# Patient Record
Sex: FEMALE | Race: BLACK OR AFRICAN AMERICAN | Employment: UNEMPLOYED | URBAN - METROPOLITAN AREA
[De-identification: names, ages, dates, MRNs, and addresses within clinical notes are randomized per-mention and may not be internally consistent; named-entity substitution may affect disease eponyms.]

---

## 2017-02-04 ENCOUNTER — HOSPITAL ENCOUNTER (EMERGENCY)
Age: 42
Discharge: HOME | End: 2017-02-04
Attending: EMERGENCY MEDICINE
Payer: SUBSIDIZED

## 2017-02-04 VITALS
HEART RATE: 82 BPM | WEIGHT: 235 LBS | OXYGEN SATURATION: 97 % | BODY MASS INDEX: 39.11 KG/M2 | RESPIRATION RATE: 16 BRPM | SYSTOLIC BLOOD PRESSURE: 175 MMHG | DIASTOLIC BLOOD PRESSURE: 81 MMHG | TEMPERATURE: 98.2 F

## 2017-02-04 DIAGNOSIS — K08.89 PAIN, DENTAL: Primary | ICD-10-CM

## 2017-02-04 PROCEDURE — 99283 EMERGENCY DEPT VISIT LOW MDM: CPT

## 2017-02-04 PROCEDURE — 74011250637 HC RX REV CODE- 250/637: Performed by: EMERGENCY MEDICINE

## 2017-02-04 RX ORDER — ACETAMINOPHEN 325 MG/1
650 TABLET ORAL
Status: COMPLETED | OUTPATIENT
Start: 2017-02-04 | End: 2017-02-04

## 2017-02-04 RX ORDER — TRAMADOL HYDROCHLORIDE 50 MG/1
50 TABLET ORAL
Qty: 12 TAB | Refills: 0 | Status: SHIPPED | OUTPATIENT
Start: 2017-02-04 | End: 2017-05-15

## 2017-02-04 RX ORDER — PENICILLIN V POTASSIUM 500 MG/1
500 TABLET, FILM COATED ORAL 4 TIMES DAILY
Qty: 28 TAB | Refills: 0 | Status: SHIPPED | OUTPATIENT
Start: 2017-02-04 | End: 2017-02-11

## 2017-02-04 RX ADMIN — ACETAMINOPHEN 650 MG: 325 TABLET, FILM COATED ORAL at 04:55

## 2017-02-04 NOTE — ED PROVIDER NOTES
HPI Comments: 4:46 AM Lenora Corcoran is a 37 y.o. female with a hx of HTN who presents to the ED c/o ULQ and LLQ dental pain onset 2 days ago but has progressively gotten worse. Pt also c/o trouble chewing, but denies trouble swallowing. She reports taking Aleve and Advil for pain, but states that it has not provided her with any relief. She mentions that she has a scheduled dentist appointment on 17. No other associated symptoms or modifying factors at this time. The history is provided by the patient. Past Medical History:   Diagnosis Date    Amenorrhea 2012    Endometrial hyperplasia without atypia, simple 2012    Hypertension 2011    Obesity 3/5/2014       Past Surgical History:   Procedure Laterality Date    Hx breast lumpectomy      Hx gyn      Hx  section  7584,6557         Family History:   Problem Relation Age of Onset    Diabetes Maternal Aunt     Asthma Paternal Aunt     Cancer Maternal Grandmother      unknonwn type    Cancer Paternal Grandmother      unknown type       Social History     Social History    Marital status: SINGLE     Spouse name: N/A    Number of children: N/A    Years of education: N/A     Occupational History    Not on file. Social History Main Topics    Smoking status: Never Smoker    Smokeless tobacco: Never Used    Alcohol use No    Drug use: No      Comment: denies    Sexual activity: Not Currently     Partners: Male     Birth control/ protection: None     Other Topics Concern    Not on file     Social History Narrative         ALLERGIES: Review of patient's allergies indicates no known allergies. Review of Systems   Constitutional: Negative for fever. HENT: Positive for dental problem. All other systems reviewed and are negative.       Vitals:    17 0413   BP: 175/81   Pulse: 82   Resp: 16   Temp: 98.2 °F (36.8 °C)   SpO2: 97%   Weight: 106.6 kg (235 lb)            Physical Exam Constitutional: She is oriented to person, place, and time. She appears well-developed and well-nourished. HENT:   Head: Normocephalic and atraumatic. Mouth/Throat: Uvula is midline. No floor of the mouth crepitance    Neck: Neck supple. No JVD present. Cardiovascular: Normal rate and regular rhythm. Pulmonary/Chest: Effort normal and breath sounds normal. No respiratory distress. Abdominal: Soft. She exhibits no distension. There is no tenderness. There is no rebound and no guarding. Musculoskeletal: She exhibits no edema. Neurological: She is alert and oriented to person, place, and time. Skin: Skin is warm and dry. No erythema. Psychiatric: Judgment normal.        MDM  Number of Diagnoses or Management Options  Pain, dental:   Diagnosis management comments: 38 y/o female presents with dental pain . No signs of serious infection, ludwigs. Pt tolerating secretions, afebrile. Will give abx, pain meds  Pt has appt with dentist in 2/13. ED Course       Procedures  Vitals:  Patient Vitals for the past 12 hrs:   Temp Pulse Resp BP SpO2   02/04/17 0413 98.2 °F (36.8 °C) 82 16 175/81 97 %     Pulsox interpreted within normal limits. Medications ordered:   Medications   acetaminophen (TYLENOL) tablet 650 mg (not administered)       Lab findings:  No results found for this or any previous visit (from the past 12 hour(s)). Reevaluation of patient:   4:49 AM I have reassessed the patient and discussed their results and diagnosis. Pt will be discharged in stable condition. Patient is to return to emergency department if any new or worsening condition. Patient understands and verbalizes agreement with plan. Disposition:  Diagnosis:   1.  Pain, dental      Disposition: Discharged    Follow-up Information     Follow up With Details Comments Contact Info    [address] [facility] 600 Schedule an appointment as soon as possible for a visit  Leslee Martell [address] [facility] EMERGENCY DEPT Go to As needed, If symptoms worsen [address]            Patient's Medications   Start Taking    PENICILLIN V POTASSIUM (VEETID) 500 MG TABLET    Take 1 Tab by mouth four (4) times daily for 7 days. TRAMADOL (ULTRAM) 50 MG TABLET    Take 1 Tab by mouth every six (6) hours as needed for Pain. Max Daily Amount: 200 mg. Continue Taking    AMLODIPINE (NORVASC) 10 MG TABLET    Take 1 Tab by mouth daily. LISINOPRIL (PRINIVIL, ZESTRIL) 40 MG TABLET    Take 1 Tab by mouth daily. MEDROXYPROGESTERONE (PROVERA) 10 MG TABLET    Take  by mouth daily. These Medications have changed    No medications on file   Stop Taking    No medications on file     SCRIBE ATTESTATION STATEMENT  Documented by: Dez Pa for, and in the presence of, Raúl Rubio DO 4:49 AM    Signed by: Navdeep Deras, 02/04/17 4:49 AM    PROVIDER ATTESTATION STATEMENT  I personally performed the services described in the documentation, reviewed the documentation, as recorded by the scribe in my presence, and it accurately and completely records my words and actions.   Raúl Rubio DO

## 2017-02-04 NOTE — ED NOTES
I have reviewed discharge instructions with the patient. The patient verbalized understanding. Patient armband removed and shredded. 2 prescriptions given. All questions answered.   Pt d/c'd to home awake, alert and in NAD

## 2017-05-15 ENCOUNTER — OFFICE VISIT (OUTPATIENT)
Dept: OBGYN CLINIC | Age: 42
End: 2017-05-15

## 2017-05-15 ENCOUNTER — HOSPITAL ENCOUNTER (OUTPATIENT)
Dept: LAB | Age: 42
Discharge: HOME | End: 2017-05-15
Payer: MEDICAID

## 2017-05-15 VITALS
WEIGHT: 235 LBS | HEIGHT: 65 IN | BODY MASS INDEX: 39.15 KG/M2 | HEART RATE: 79 BPM | SYSTOLIC BLOOD PRESSURE: 169 MMHG | DIASTOLIC BLOOD PRESSURE: 113 MMHG

## 2017-05-15 DIAGNOSIS — I10 ESSENTIAL HYPERTENSION: ICD-10-CM

## 2017-05-15 DIAGNOSIS — Z12.39 BREAST CANCER SCREENING: ICD-10-CM

## 2017-05-15 DIAGNOSIS — N91.2 AMENORRHEA: ICD-10-CM

## 2017-05-15 DIAGNOSIS — Z01.419 WELL WOMAN EXAM WITH ROUTINE GYNECOLOGICAL EXAM: Primary | ICD-10-CM

## 2017-05-15 PROCEDURE — 88175 CYTOPATH C/V AUTO FLUID REDO: CPT | Performed by: OBSTETRICS & GYNECOLOGY

## 2017-05-15 PROCEDURE — 87624 HPV HI-RISK TYP POOLED RSLT: CPT | Performed by: OBSTETRICS & GYNECOLOGY

## 2017-05-15 RX ORDER — MEDROXYPROGESTERONE ACETATE 10 MG/1
10 TABLET ORAL DAILY
Qty: 10 TAB | Refills: 1 | Status: SHIPPED | OUTPATIENT
Start: 2017-05-15 | End: 2017-05-25

## 2017-05-15 NOTE — PROGRESS NOTES
Subjective:   37 y.o. female for annual routine Pap and checkup. No LMP recorded. Last pap smear:  2016 NILM  Menstrual history: amenorrhea, usually takes Provera to induce menses; h/o hyperplasia without atypia  Dysmenorrhea no  H/o STIs:  no  Social History: not sexually active. [unfilled]  OB History    Para Term  AB SAB TAB Ectopic Multiple Living   2 2 2       2      # Outcome Date GA Lbr Calderon/2nd Weight Sex Delivery Anes PTL Lv   2 Term  40w0d   M    Y      Birth Comments: repeat   1 Term 37 37w0d   M    Y      Birth Comments: failed to dialate          Pertinent past medical hstory: hypertension, no history of DVT, CAD, DM, liver disease, migraines or smoking. Patient Active Problem List   Diagnosis Code    Amenorrhea N91.2    Uterine enlargement N85.2    Endometrial hyperplasia without atypia, simple N85.01    Obesity E66.9    Hypertension I10    Hyperlipidemia E78.5     Patient Active Problem List    Diagnosis Date Noted    Hypertension 2014    Hyperlipidemia 2014    Obesity 2014    Endometrial hyperplasia without atypia, simple 2012    Amenorrhea 2012    Uterine enlargement 2012     Current Outpatient Prescriptions   Medication Sig Dispense Refill    medroxyPROGESTERone (PROVERA) 10 mg tablet Take 1 Tab by mouth daily for 10 days. 10 Tab 1    amLODIPine (NORVASC) 10 mg tablet Take 1 Tab by mouth daily. 30 Tab 0    lisinopril (PRINIVIL, ZESTRIL) 40 mg tablet Take 1 Tab by mouth daily.  30 Tab 5     No Known Allergies  Past Medical History:   Diagnosis Date    Amenorrhea 2012    Endometrial hyperplasia without atypia, simple 2012    Hypertension 2011    Obesity 3/5/2014     Past Surgical History:   Procedure Laterality Date    HX BREAST LUMPECTOMY      HX  SECTION  0918,0702    HX GYN       Family History   Problem Relation Age of Onset    Diabetes Maternal Aunt     Asthma Paternal Aunt     Cancer Maternal Grandmother      unknonwn type    Cancer Paternal Grandmother      unknown type     Social History   Substance Use Topics    Smoking status: Never Smoker    Smokeless tobacco: Never Used    Alcohol use No        ROS:  Feeling well. No dyspnea or chest pain on exertion. No abdominal pain, change in bowel habits, black or bloody stools. No urinary tract symptoms. GYN ROS: no pelvic pain or discharge, no breast pain or new or enlarging lumps on self exam. No neurological complaints. Objective:     Visit Vitals    BP (!) 169/113    Pulse 79    Ht 5' 5\" (1.651 m)    Wt 235 lb (106.6 kg)    BMI 39.11 kg/m2     The patient appears well, alert, oriented x 3, in no distress. ENT normal.  Neck supple. No adenopathy or thyromegaly. AMAURY. Lungs are clear, good air entry, no wheezes, rhonchi or rales. S1 and S2 normal, no murmurs, regular rate and rhythm. Abdomen soft without tenderness, guarding, mass or organomegaly. Extremities show no edema, normal peripheral pulses. Neurological is normal, no focal findings. BREAST EXAM: right breast normal without mass, skin or nipple changes or axillary nodes, left breast normal without mass, skin or nipple changes or axillary nodes    PELVIC EXAM: VULVA: normal appearing vulva with no masses, tenderness or lesions, VAGINA: normal appearing vagina with normal color and discharge, no lesions, CERVIX: normal appearing cervix without discharge or lesions, UTERUS: uterus is normal size, shape, consistency and nontender,  . Pelvic exam limited due to body habitus  . ADNEXA: normal adnexa in size, nontender and no masses, PAP: Pap smear done today    Assessment/Plan:   well woman  mammogram  pap smear  counseled on breast self exam and mammography screening  additional lab tests per orders  RTO for EMB and follow up for h/o endometrial hyperplasia. ICD-10-CM ICD-9-CM    1.  Well woman exam with routine gynecological exam Z01.419 V72.31 PAP IG, APTIMA HPV AND RFX 16/18,45 ([id number])   2. Breast cancer screening Z12.39 V76.10 [facility] MAMMO BI SCREENING INCL CAD   3. Amenorrhea N91.2 626.0 medroxyPROGESTERone (PROVERA) 10 mg tablet   4. Essential hypertension I10 401.9 REFERRAL TO FAMILY PRACTICE   . Discussed with patient that our office will call for abnormal lab results. Normal results can be reviewed through [employer]. If patient has not received a phone call from our office or there are no results found on [employer], the patient has been instructed to call our office to follow up on results. I have verbalized the plan of care with patient and the patient expressed understanding.    All questions were answered

## 2017-05-15 NOTE — LETTER
5/19/2017 1:56 PM 
 
Ms. Sherlyn Vargas [address] Dear Sherlyn Coburn I have reviewed your results and have found the results listed below to be within normal ranges. Pap Smear My recommendations are as follows: Please schedule your next Pap Smear in three years . Your Next Annual Physical is due May 2018. Please call if you have any questions [phone] . Sincerely, 
 
 
Ivet Do

## 2017-05-15 NOTE — PATIENT INSTRUCTIONS

## 2017-05-15 NOTE — MR AVS SNAPSHOT
Visit Information Date & Time Provider Department Dept. Phone Encounter #  
 5/15/2017 11:30 AM Renetta Cutler, [phone] [id number] Follow-up Instructions Return in about 2 weeks (around 5/29/2017) for EMB. Upcoming Health Maintenance Date Due INFLUENZA AGE 9 TO ADULT 8/1/2017 PAP AKA CERVICAL CYTOLOGY 2/24/2019 Allergies as of 5/15/2017  Review Complete On: 5/15/2017 By: Renetta Cutler,  No Known Allergies Current Immunizations  Never Reviewed No immunizations on file. Not reviewed this visit You Were Diagnosed With   
  
 Codes Comments Well woman exam with routine gynecological exam    -  Primary ICD-10-CM: K24.491 ICD-9-CM: V72.31 Breast cancer screening     ICD-10-CM: Z12.39 
ICD-9-CM: V76.10 Amenorrhea     ICD-10-CM: N91.2 ICD-9-CM: 626.0 Vitals BP Pulse Height(growth percentile) Weight(growth percentile) BMI OB Status (!) 169/113 79 5' 5\" (1.651 m) 235 lb (106.6 kg) 39.11 kg/m2 Having regular periods Smoking Status Never Smoker Vitals History BMI and BSA Data Body Mass Index Body Surface Area  
 39.11 kg/m 2 2.21 m 2 Preferred Pharmacy Pharmacy Name Phone [id number] Marycruz Gallagher, [address] [phone] Your Updated Medication List  
  
   
This list is accurate as of: 5/15/17 12:20 PM.  Always use your most recent med list. amLODIPine 10 mg tablet Commonly known as:  Hattie Rafter Take 1 Tab by mouth daily. lisinopril 40 mg tablet Commonly known as:  Dylan Lofty Take 1 Tab by mouth daily. medroxyPROGESTERone 10 mg tablet Commonly known as:  PROVERA Take 1 Tab by mouth daily for 10 days. Prescriptions Sent to Pharmacy Refills  
 medroxyPROGESTERone (PROVERA) 10 mg tablet 1 Sig: Take 1 Tab by mouth daily for 10 days.   
 Class: Normal  
 Pharmacy: [facility], [address] [place] [address] #: [phone] Route: Oral  
  
Follow-up Instructions Return in about 2 weeks (around 5/29/2017) for EMB. To-Do List   
 05/15/2017 Imaging:  [facility] MAMMO BI SCREENING INCL CAD Patient Instructions Well Visit, Ages 25 to 48: Care Instructions Your Care Instructions Physical exams can help you stay healthy. Your doctor has checked your overall health and may have suggested ways to take good care of yourself. He or she also may have recommended tests. At home, you can help prevent illness with healthy eating, regular exercise, and other steps. Follow-up care is a key part of your treatment and safety. Be sure to make and go to all appointments, and call your doctor if you are having problems. It's also a good idea to know your test results and keep a list of the medicines you take. How can you care for yourself at home? · Reach and stay at a healthy weight. This will lower your risk for many problems, such as obesity, diabetes, heart disease, and high blood pressure. · Get at least 30 minutes of physical activity on most days of the week. Walking is a good choice. You also may want to do other activities, such as running, swimming, cycling, or playing tennis or team sports. Discuss any changes in your exercise program with your doctor. · Do not smoke or allow others to smoke around you. If you need help quitting, talk to your doctor about stop-smoking programs and medicines. These can increase your chances of quitting for good. · Talk to your doctor about whether you have any risk factors for sexually transmitted infections (STIs). Having one sex partner (who does not have STIs and does not have sex with anyone else) is a good way to avoid these infections. · Use birth control if you do not want to have children at this time.  Talk with your doctor about the choices available and what might be best for you. 
· Protect your skin from too much sun. When you're outdoors from 10 a.m. to 4 p.m., stay in the shade or cover up with clothing and a hat with a wide brim. Wear sunglasses that block UV rays. Even when it's cloudy, put broad-spectrum sunscreen (SPF 30 or higher) on any exposed skin. · See a dentist one or two times a year for checkups and to have your teeth cleaned. · Wear a seat belt in the car. · Drink alcohol in moderation, if at all. That means no more than 2 drinks a day for men and 1 drink a day for women. Follow your doctor's advice about when to have certain tests. These tests can spot problems early. For everyone · Cholesterol. Have the fat (cholesterol) in your blood tested after age 21. Your doctor will tell you how often to have this done based on your age, family history, or other things that can increase your risk for heart disease. · Blood pressure. Have your blood pressure checked during a routine doctor visit. Your doctor will tell you how often to check your blood pressure based on your age, your blood pressure results, and other factors. · Vision. Talk with your doctor about how often to have a glaucoma test. 
· Diabetes. Ask your doctor whether you should have tests for diabetes. · Colon cancer. Have a test for colon cancer at age 48. You may have one of several tests. If you are younger than 48, you may need a test earlier if you have any risk factors. Risk factors include whether you already had a precancerous polyp removed from your colon or whether your parent, brother, sister, or child has had colon cancer. For women · Breast exam and mammogram. Talk to your doctor about when you should have a clinical breast exam and a mammogram. Medical experts differ on whether and how often women under 50 should have these tests. Your doctor can help you decide what is right for you. · Pap test and pelvic exam. Begin Pap tests at age 24.  A Pap test is the best way to find cervical cancer. The test often is part of a pelvic exam. Ask how often to have this test. 
· Tests for sexually transmitted infections (STIs). Ask whether you should have tests for STIs. You may be at risk if you have sex with more than one person, especially if your partners do not wear condoms. For men · Tests for sexually transmitted infections (STIs). Ask whether you should have tests for STIs. You may be at risk if you have sex with more than one person, especially if you do not wear a condom. · Testicular cancer exam. Ask your doctor whether you should check your testicles regularly. · Prostate exam. Talk to your doctor about whether you should have a blood test (called a PSA test) for prostate cancer. Experts differ on whether and when men should have this test. Some experts suggest it if you are older than 39 and are -American or have a father or brother who got prostate cancer when he was younger than 72. When should you call for help? Watch closely for changes in your health, and be sure to contact your doctor if you have any problems or symptoms that concern you. Where can you learn more? Go to http://kerry-chucho.info/. Enter P072 in the search box to learn more about \"Well Visit, Ages 25 to 48: Care Instructions. \" Current as of: July 19, 2016 Content Version: 11.2 © 6312-2002 [employer]. Care instructions adapted under license by [employer] (which disclaims liability or warranty for this information). If you have questions about a medical condition or this instruction, always ask your healthcare professional. [place] [address] any warranty or liability for your use of this information. Introducing [address]! [facility] introduces [employer] patient portal. Now you can access parts of your medical record, email your doctor's office, and request medication refills online. 1. In your internet browser, go to [email or web address] 2. Click on the First Time User? Click Here link in the Sign In box. You will see the New Member Sign Up page. 3. Enter your Ogin Access Code exactly as it appears below. You will not need to use this code after youve completed the sign-up process. If you do not sign up before the expiration date, you must request a new code. · Ogin Access Code: [email or web address] Expires: 8/13/2017 12:20 PM 
 
4. Enter the last four digits of your Social Security Number (xxxx) and Date of Birth (mm/dd/yyyy) as indicated and click Submit. You will be taken to the next sign-up page. 5. Create a Ogin ID. This will be your Ogin login ID and cannot be changed, so think of one that is secure and easy to remember. 6. Create a Ogin password. You can change your password at any time. 7. Enter your Password Reset Question and Answer. This can be used at a later time if you forget your password. 8. Enter your e-mail address. You will receive e-mail notification when new information is available in [address] 9. Click Sign Up. You can now view and download portions of your medical record. 10. Click the Download Summary menu link to download a portable copy of your medical information. If you have questions, please visit the Frequently Asked Questions section of the Ogin website. Remember, Ogin is NOT to be used for urgent needs. For medical emergencies, dial 911. Now available from your iPhone and Android! Please provide this summary of care documentation to your next provider. Your primary care clinician is listed as NONE. If you have any questions after today's visit, please call [phone].

## 2017-05-18 NOTE — PROGRESS NOTES
Pap NILM. Repeat pap in 3 years or as clinically indicated. PRASAD Wilson to send letter to patient with above recommendation.

## 2017-05-30 ENCOUNTER — OFFICE VISIT (OUTPATIENT)
Dept: OBGYN CLINIC | Age: 42
End: 2017-05-30

## 2017-05-30 ENCOUNTER — HOSPITAL ENCOUNTER (OUTPATIENT)
Dept: LAB | Age: 42
Discharge: HOME | End: 2017-05-30
Payer: SUBSIDIZED

## 2017-05-30 VITALS
SYSTOLIC BLOOD PRESSURE: 153 MMHG | WEIGHT: 239 LBS | BODY MASS INDEX: 39.82 KG/M2 | TEMPERATURE: 98.5 F | HEIGHT: 65 IN | DIASTOLIC BLOOD PRESSURE: 96 MMHG | HEART RATE: 79 BPM

## 2017-05-30 DIAGNOSIS — N85.01 ENDOMETRIAL HYPERPLASIA WITHOUT ATYPIA, SIMPLE: Primary | ICD-10-CM

## 2017-05-30 LAB
HCG URINE, QL. (POC): NEGATIVE
VALID INTERNAL CONTROL?: YES

## 2017-05-30 PROCEDURE — 88305 TISSUE EXAM BY PATHOLOGIST: CPT | Performed by: OBSTETRICS & GYNECOLOGY

## 2017-05-30 NOTE — PROGRESS NOTES
Endometrial Biopsy Procedure Note    Endometrial biopsy was performed today. Indications: followup to previous abnormal endometrial biopsy    Procedure Details   Urine pregnancy test was done and result is negative. The risks (including infection, bleeding, pain, and uterine perforation) and benefits of the procedure were explained to the patient and written informed consent was obtained. Antibiotic prophylaxis against endocarditis was not indicated. The patient was placed in the dorsal lithotomy position. A speculum inserted in the vagina, and the cervix prepped with povidone iodine. A sterile uterine sound was used to sound the uterus to a depth of 10 cm. A Pipelle endometrial aspirator was used to sample the endometrium. Sample was sent for pathologic examination. The patient tolerated the procedure well and there were no complications. Plan:  The patient was advised to call for any fever or for prolonged or severe pain or bleeding. She was advised to use OTC ibuprofen as needed for mild to moderate pain. She was advised to avoid vaginal intercourse for 48 hours or until the bleeding has completely stopped. My office will get in touch with her as soon as we have the pathology report. I have verbalized the plan of care with patient. The patient was given a full opportunity to ask questions and indicated that her questions had been answered.

## 2017-05-30 NOTE — PATIENT INSTRUCTIONS
Endometrial Biopsy: About This Test  What is it? An endometrial biopsy is a way for your doctor to take a small sample of the lining of the uterus (endometrium). The sample is looked at under a microscope for abnormal cells. An endometrial biopsy helps your doctor find problems in the endometrium. Why is this test done? An endometrial biopsy is done to check for cancer of the uterus. The test is also done if you have abnormal bleeding from your uterus or are having problems getting pregnant. The test results show how your body's hormones are affecting the lining of the uterus. How can you prepare for the test?  Talk to your doctor about all your health conditions before the test. For example, tell your doctor if you:  · Are or might be pregnant. An endometrial biopsy is not done during pregnancy. · Are taking any medicines. · Are allergic to any medicines. · Have had bleeding problems, or if you take aspirin or some other blood thinner. · Have been treated for an infection in your pelvic area. · Have any heart or lung problems. Other ways to prepare:  · Do not douche, use tampons, or use vaginal medicines for 24 hours before the test.  · Ask your doctor if you should take a pain reliever, such as ibuprofen (Advil or Motrin), 30 to 60 minutes before the test. This can help reduce any cramping pain that the test can cause. · Talk to your doctor if you have concerns about the need for the test, its risks, how it will be done, or what the results may mean. What happens before the test?  · You will empty your bladder just before the test.  · You will be asked to sign a consent form that says you understand the risks of the test and agree to have it done. What happens during the test?  · You will lie on an exam table. Your feet will be in stirrups. · The doctor may use a spray or injection to numb your cervix. The cervix is the opening to the uterus.   · The doctor will use a tool called a speculum to see the cervix. · Then the doctor will pass a thin tube through the cervix to take a sample of the uterus lining. You may feel a sharp cramp when the doctor collects the sample. · The sample is sent to a lab. How long does the test take? The test will take about 5 to 15 minutes. What happens after the test?  · You will probably be able to go home right away. · You likely will have mild vaginal bleeding and may have cramps for a few days after the test. The cramps may feel like bad menstrual cramps. · Ask your doctor if you can take an over-the-counter pain medicine, such as acetaminophen (Tylenol), ibuprofen (Advil, Motrin), or naproxen (Aleve). Be safe with medicines. Read and follow all instructions on the label. · Do not have sex, use tampons, or douche until the spotting stops. Use pads for vaginal bleeding or discharge. · Do not do strenuous exercise or heavy lifting for one day after your biopsy. Follow-up care is a key part of your treatment and safety. Be sure to make and go to all appointments, and call your doctor if you are having problems. It's also a good idea to keep a list of the medicines you take. Ask your doctor when you can expect to have your test results. Where can you learn more? Go to http://kerry-chucho.info/. Enter [phone] in the search box to learn more about \"Endometrial Biopsy: About This Test.\"  Current as of: October 13, 2016  Content Version: 11.2  © 3984-3769 [employer]. Care instructions adapted under license by [employer] (which disclaims liability or warranty for this information). If you have questions about a medical condition or this instruction, always ask your healthcare professional. [address] any warranty or liability for your use of this information.

## 2017-05-30 NOTE — MR AVS SNAPSHOT
Visit Information Date & Time Provider Department Dept. Phone Encounter #  
 5/30/2017 11:00 AM [phone] [id number] Follow-up Instructions Return in about 2 weeks (around 6/13/2017). Upcoming Health Maintenance Date Due INFLUENZA AGE 9 TO ADULT 8/1/2017 PAP AKA CERVICAL CYTOLOGY 5/15/2020 Allergies as of 5/30/2017  Review Complete On: 5/30/2017 By: Linda Centeno, DO No Known Allergies Current Immunizations  Never Reviewed No immunizations on file. Not reviewed this visit You Were Diagnosed With   
  
 Codes Comments Endometrial hyperplasia without atypia, simple    -  Primary ICD-10-CM: N85.01 
ICD-9-CM: 621.31 Vitals BP Pulse Temp Height(growth percentile) Weight(growth percentile) LMP  
 (!) 153/96 79 98.5 °F (36.9 °C) (Oral) 5' 5\" (1.651 m) 239 lb (108.4 kg) 03/03/2017 (Within Days) BMI OB Status Smoking Status 39.77 kg/m2 Having regular periods Never Smoker BMI and BSA Data Body Mass Index Body Surface Area  
 39.77 kg/m 2 2.23 m 2 Preferred Pharmacy Pharmacy Name Phone [id number] [place] [address] [phone] Your Updated Medication List  
  
   
This list is accurate as of: 5/30/17 11:46 AM.  Always use your most recent med list. amLODIPine 10 mg tablet Commonly known as:  Benjiman Floro Take 1 Tab by mouth daily. lisinopril 40 mg tablet Commonly known as:  Rexanne  Take 1 Tab by mouth daily. We Performed the Following AMB POC URINE PREGNANCY TEST, VISUAL COLOR COMPARISON [78837 CPT(R)] Follow-up Instructions Return in about 2 weeks (around 6/13/2017). Patient Instructions Endometrial Biopsy: About This Test 
What is it?  
 
An endometrial biopsy is a way for your doctor to take a small sample of the lining of the uterus (endometrium). The sample is looked at under a microscope for abnormal cells. An endometrial biopsy helps your doctor find problems in the endometrium. Why is this test done? An endometrial biopsy is done to check for cancer of the uterus. The test is also done if you have abnormal bleeding from your uterus or are having problems getting pregnant. The test results show how your body's hormones are affecting the lining of the uterus. How can you prepare for the test? 
Talk to your doctor about all your health conditions before the test. For example, tell your doctor if you: · Are or might be pregnant. An endometrial biopsy is not done during pregnancy. · Are taking any medicines. · Are allergic to any medicines. · Have had bleeding problems, or if you take aspirin or some other blood thinner. · Have been treated for an infection in your pelvic area. · Have any heart or lung problems. Other ways to prepare: · Do not douche, use tampons, or use vaginal medicines for 24 hours before the test. 
· Ask your doctor if you should take a pain reliever, such as ibuprofen (Advil or Motrin), 30 to 60 minutes before the test. This can help reduce any cramping pain that the test can cause. · Talk to your doctor if you have concerns about the need for the test, its risks, how it will be done, or what the results may mean. What happens before the test? 
· You will empty your bladder just before the test. 
· You will be asked to sign a consent form that says you understand the risks of the test and agree to have it done. What happens during the test? 
· You will lie on an exam table. Your feet will be in stirrups. · The doctor may use a spray or injection to numb your cervix. The cervix is the opening to the uterus. · The doctor will use a tool called a speculum to see the cervix.  
· Then the doctor will pass a thin tube through the cervix to take a sample of the uterus lining. You may feel a sharp cramp when the doctor collects the sample. · The sample is sent to a lab. How long does the test take? The test will take about 5 to 15 minutes. What happens after the test? 
· You will probably be able to go home right away. · You likely will have mild vaginal bleeding and may have cramps for a few days after the test. The cramps may feel like bad menstrual cramps. · Ask your doctor if you can take an over-the-counter pain medicine, such as acetaminophen (Tylenol), ibuprofen (Advil, Motrin), or naproxen (Aleve). Be safe with medicines. Read and follow all instructions on the label. · Do not have sex, use tampons, or douche until the spotting stops. Use pads for vaginal bleeding or discharge. · Do not do strenuous exercise or heavy lifting for one day after your biopsy. Follow-up care is a key part of your treatment and safety. Be sure to make and go to all appointments, and call your doctor if you are having problems. It's also a good idea to keep a list of the medicines you take. Ask your doctor when you can expect to have your test results. Where can you learn more? Go to http://kerry-chucho.info/. Enter [phone] in the search box to learn more about \"Endometrial Biopsy: About This Test.\" Current as of: October 13, 2016 Content Version: 11.2 © 7942-8829 [employer]. Care instructions adapted under license by [employer] (which disclaims liability or warranty for this information). If you have questions about a medical condition or this instruction, always ask your healthcare professional. [place] [address] any warranty or liability for your use of this information. Introducing [facility]! Nona Chaves introduces [employer]PD[employer] patient portal. Now you can access parts of your medical record, email your doctor's office, and request medication refills online. 1. In your internet browser, go to [email or web address] 2. Click on the First Time User? Click Here link in the Sign In box. You will see the New Member Sign Up page. 3. Enter your [employer] Access Code exactly as it appears below. You will not need to use this code after youve completed the sign-up process. If you do not sign up before the expiration date, you must request a new code. · [employer] Access Code: [email or web address] Expires: 8/13/2017 12:20 PM 
 
4. Enter the last four digits of your Social Security Number (xxxx) and Date of Birth (mm/dd/yyyy) as indicated and click Submit. You will be taken to the next sign-up page. 5. Create a [employer]Bureau ID. This will be your [employer] login ID and cannot be changed, so think of one that is secure and easy to remember. 6. Create a [employer] password. You can change your password at any time. 7. Enter your Password Reset Question and Answer. This can be used at a later time if you forget your password. 8. Enter your e-mail address. You will receive e-mail notification when new information is available in [address] 9. Click Sign Up. You can now view and download portions of your medical record. 10. Click the Download Summary menu link to download a portable copy of your medical information. If you have questions, please visit the Frequently Asked Questions section of the [employer] website. Remember, [employer] is NOT to be used for urgent needs. For medical emergencies, dial 911. Now available from your iPhone and Android! Please provide this summary of care documentation to your next provider. Your primary care clinician is listed as NONE. If you have any questions after today's visit, please call [phone].

## 2017-06-13 ENCOUNTER — HOSPITAL ENCOUNTER (OUTPATIENT)
Dept: LAB | Age: 42
Discharge: HOME | End: 2017-06-13
Payer: SUBSIDIZED

## 2017-06-13 ENCOUNTER — OFFICE VISIT (OUTPATIENT)
Dept: OBGYN CLINIC | Age: 42
End: 2017-06-13

## 2017-06-13 VITALS
BODY MASS INDEX: 39.15 KG/M2 | DIASTOLIC BLOOD PRESSURE: 104 MMHG | HEART RATE: 82 BPM | HEIGHT: 65 IN | WEIGHT: 235 LBS | SYSTOLIC BLOOD PRESSURE: 153 MMHG

## 2017-06-13 DIAGNOSIS — N30.00 ACUTE CYSTITIS WITHOUT HEMATURIA: ICD-10-CM

## 2017-06-13 DIAGNOSIS — N30.00 ACUTE CYSTITIS WITHOUT HEMATURIA: Primary | ICD-10-CM

## 2017-06-13 DIAGNOSIS — Z30.09 FAMILY PLANNING: ICD-10-CM

## 2017-06-13 DIAGNOSIS — N94.9 GENITAL LESION, FEMALE: ICD-10-CM

## 2017-06-13 PROBLEM — A63.0 GENITAL WARTS: Status: ACTIVE | Noted: 2017-06-13

## 2017-06-13 LAB
BILIRUB UR QL STRIP: NEGATIVE
GLUCOSE UR-MCNC: NEGATIVE MG/DL
KETONES P FAST UR STRIP-MCNC: NEGATIVE MG/DL
PH UR STRIP: 7 [PH] (ref 4.6–8)
PROT UR QL STRIP: NEGATIVE MG/DL
SP GR UR STRIP: 1.03 (ref 1–1.03)
UA UROBILINOGEN AMB POC: NORMAL (ref 0.2–1)
URINALYSIS CLARITY POC: CLEAR
URINALYSIS COLOR POC: YELLOW
URINE BLOOD POC: NEGATIVE
URINE LEUKOCYTES POC: NEGATIVE
URINE NITRITES POC: POSITIVE

## 2017-06-13 PROCEDURE — 87086 URINE CULTURE/COLONY COUNT: CPT | Performed by: OBSTETRICS & GYNECOLOGY

## 2017-06-13 PROCEDURE — 87077 CULTURE AEROBIC IDENTIFY: CPT | Performed by: OBSTETRICS & GYNECOLOGY

## 2017-06-13 PROCEDURE — 87186 SC STD MICRODIL/AGAR DIL: CPT | Performed by: OBSTETRICS & GYNECOLOGY

## 2017-06-13 PROCEDURE — 87255 GENET VIRUS ISOLATE HSV: CPT | Performed by: OBSTETRICS & GYNECOLOGY

## 2017-06-13 PROCEDURE — 87491 CHLMYD TRACH DNA AMP PROBE: CPT | Performed by: OBSTETRICS & GYNECOLOGY

## 2017-06-13 RX ORDER — SULFAMETHOXAZOLE AND TRIMETHOPRIM 800; 160 MG/1; MG/1
1 TABLET ORAL 2 TIMES DAILY
Qty: 6 TAB | Refills: 0 | Status: SHIPPED | OUTPATIENT
Start: 2017-06-13 | End: 2017-06-16

## 2017-06-13 RX ORDER — ACETAMINOPHEN AND CODEINE PHOSPHATE 120; 12 MG/5ML; MG/5ML
1 SOLUTION ORAL DAILY
Qty: 28 TAB | Refills: 11 | Status: SHIPPED | OUTPATIENT
Start: 2017-06-13 | End: 2018-05-10 | Stop reason: SDUPTHER

## 2017-06-13 RX ORDER — ACYCLOVIR 400 MG/1
400 TABLET ORAL 3 TIMES DAILY
Qty: 30 TAB | Refills: 0 | Status: SHIPPED | OUTPATIENT
Start: 2017-06-13 | End: 2017-06-23

## 2017-06-13 NOTE — PATIENT INSTRUCTIONS
Mini-Pills for Birth Control: Care Instructions  Your Care Instructions  Mini-pills are used to prevent pregnancy. They release a regular dose of the hormone progestin. They are different from regular combination birth control pills, which contain both progestin and estrogen. Mini-pills come in packs. Every mini-pill in the pack contains progestin. There are no sugar pills. So you have to take a pill every day at the same time to prevent pregnancy, even during your period. Follow-up care is a key part of your treatment and safety. Be sure to make and go to all appointments, and call your doctor if you are having problems. It's also a good idea to know your test results and keep a list of the medicines you take. How can you care for yourself at home? How do you take the mini-pill? · Follow your doctor's instructions about when to start taking your pills. It's best to take your first pill on the first day of your period. If you take the first pill another day, use backup birth control, such as a condom, or don't have intercourse for the next 48 hours (2 days). · Take every mini-pill in the pack, even during your period. Don't stop taking your pills if you have spotting between periods. · Take the pill at the same time every day. · Start your next pack the day after the last pack is finished. There is no break between packs. Always have your next pack of pills ready. What if you forget to take a pill? Always read the label for specific instructions, or call your doctor. Here are some basic guidelines:  · If you take a pill more than 3 hours late, take it as soon as you remember, even if that means you will take 2 pills in one day. Then go back to your regular schedule. · Use backup birth control, such as a condom, or don't have intercourse for the next 48 hours to prevent pregnancy.   · If you had intercourse within 5 days of forgetting to take the pill, you can use emergency contraception, such as the morning-after pill (Plan B). You can use emergency contraception for up to 5 days after having had sex, but it works best if you take it right away. What else do you need to know? · The mini-pill has side effects. ¨ You may have changes in your period and your period may stop. You may also have spotting or bleeding between periods. ¨ You may have mood changes, less interest in sex, or weight gain. · If you vomit or have diarrhea soon after taking a pill, use a backup method or don't have intercourse for 7 days. · Check with your doctor before you use any other medicines, including over-the-counter medicines, vitamins, herbal products, and supplements. Birth control hormones may not work as well to prevent pregnancy when combined with other medicines. · The mini-pill doesn't protect against sexually transmitted infections (STIs), such as herpes or HIV/AIDS. If you're not sure whether your sex partner might have an STI, use a condom to protect against disease. When should you call for help? Call your doctor now or seek immediate medical care if:  · You have severe belly pain. Watch closely for changes in your health, and be sure to contact your doctor if:  · You think you may be pregnant. · You have any problems with your birth control. · You feel you may be depressed. · You regularly have spotting. · You think you may have been exposed to or have a sexually transmitted infection. Where can you learn more? Go to http://kerry-chucho.info/. Enter T269 in the search box to learn more about \"Mini-Pills for Birth Control: Care Instructions. \"  Current as of: May 30, 2016  Content Version: 11.2  © 8312-2758 [employer]. Care instructions adapted under license by [employer] (which disclaims liability or warranty for this information).  If you have questions about a medical condition or this instruction, always ask your healthcare professional. [employer] disclaims any warranty or liability for your use of this information. Urinary Tract Infection in Women: Care Instructions  Your Care Instructions    A urinary tract infection, or UTI, is a general term for an infection anywhere between the kidneys and the urethra (where urine comes out). Most UTIs are bladder infections. They often cause pain or burning when you urinate. UTIs are caused by bacteria and can be cured with antibiotics. Be sure to complete your treatment so that the infection goes away. Follow-up care is a key part of your treatment and safety. Be sure to make and go to all appointments, and call your doctor if you are having problems. It's also a good idea to know your test results and keep a list of the medicines you take. How can you care for yourself at home? · Take your antibiotics as directed. Do not stop taking them just because you feel better. You need to take the full course of antibiotics. · Drink extra water and other fluids for the next day or two. This may help wash out the bacteria that are causing the infection. (If you have kidney, heart, or liver disease and have to limit fluids, talk with your doctor before you increase your fluid intake.)  · Avoid drinks that are carbonated or have caffeine. They can irritate the bladder. · Urinate often. Try to empty your bladder each time. · To relieve pain, take a hot bath or lay a heating pad set on low over your lower belly or genital area. Never go to sleep with a heating pad in place. To prevent UTIs  · Drink plenty of water each day. This helps you urinate often, which clears bacteria from your system. (If you have kidney, heart, or liver disease and have to limit fluids, talk with your doctor before you increase your fluid intake.)  · Urinate when you need to. · Urinate right after you have sex. · Change sanitary pads often.   · Avoid douches, bubble baths, feminine hygiene sprays, and other feminine hygiene products that have deodorants. · After going to the bathroom, wipe from front to back. When should you call for help? Call your doctor now or seek immediate medical care if:  · Symptoms such as fever, chills, nausea, or vomiting get worse or appear for the first time. · You have new pain in your back just below your rib cage. This is called flank pain. · There is new blood or pus in your urine. · You have any problems with your antibiotic medicine. Watch closely for changes in your health, and be sure to contact your doctor if:  · You are not getting better after taking an antibiotic for 2 days. · Your symptoms go away but then come back. Where can you learn more? Go to http://kerry-chucho.info/. Enter I342 in the search box to learn more about \"Urinary Tract Infection in Women: Care Instructions. \"  Current as of: November 28, 2016  Content Version: 11.2  © 9544-3773 [employer]. Care instructions adapted under license by [employer] (which disclaims liability or warranty for this information). If you have questions about a medical condition or this instruction, always ask your healthcare professional. [place] [address] any warranty or liability for your use of this information.

## 2017-06-13 NOTE — PROGRESS NOTES
Subjective:      Celi Michelle is a 40 y.o. female who complains of burning in vulvar area x 1 week. C/o dysuria, but also has burning even without urination. Recently had IC with a new partner, but prior to this, no sex x 2 years. No fevers/chills. C/o itching vaginally  No vaginal discharge. Concerned about possibly having a genital wart outbreak. Menstrual history:  She has been bleeding infrequently, usually has to induce with Provera. EMB from last visit neg. Has a h/o hyperplasia without atypia  Dysmenorrhea: none. Sexual history: single partner, contraception - none. Prior Pap smear:was normal.    OB History      Para Term  AB TAB SAB Ectopic Multiple Living    2 2 2       2        Patient Active Problem List   Diagnosis Code    Amenorrhea N91.2    Uterine enlargement N85.2    Endometrial hyperplasia without atypia, simple N85.01    Obesity E66.9    Hypertension I10    Hyperlipidemia E78.5     Current Outpatient Prescriptions   Medication Sig Dispense Refill    acyclovir (ZOVIRAX) 400 mg tablet Take 1 Tab by mouth three (3) times daily for 10 days. 30 Tab 0    trimethoprim-sulfamethoxazole (BACTRIM DS, SEPTRA DS) 160-800 mg per tablet Take 1 Tab by mouth two (2) times a day for 3 days. 6 Tab 0    norethindrone (MICRONOR) 0.35 mg tab Take 1 Tab by mouth daily. 28 Tab 11    amLODIPine (NORVASC) 10 mg tablet Take 1 Tab by mouth daily. 30 Tab 0    lisinopril (PRINIVIL, ZESTRIL) 40 mg tablet Take 1 Tab by mouth daily.  30 Tab 5     No Known Allergies  Past Medical History:   Diagnosis Date    Amenorrhea 2012    Endometrial hyperplasia without atypia, simple 2012    Hypertension 2011    Obesity 3/5/2014     Past Surgical History:   Procedure Laterality Date    HX BREAST LUMPECTOMY      HX  SECTION  5903,2499    HX GYN       Family History   Problem Relation Age of Onset    Diabetes Maternal Aunt     Asthma Paternal Aunt     Cancer Maternal Grandmother      unknonwn type    Cancer Paternal Grandmother      unknown type     Social History   Substance Use Topics    Smoking status: Never Smoker    Smokeless tobacco: Never Used    Alcohol use No        Review of Systems:   General ROS: negative for fever, chills, malaise  Endocrine ROS: negative for -  breast tenderness/mass/nipple discharge/galactorrhea, hair pattern changes, skin changes or temperature intolerance. Respiratory ROS: no cough, shortness of breath, or wheezing  Cardiovascular ROS: no chest pain or dyspnea on exertion  Gastrointestinal ROS: no abdominal pain, change in bowel habits, or black or bloody stools, nausea, vomiting  Genito-Urinary ROS: +dysuria, trouble voiding, no incontinence or hematuria. No pelvic pain, abnormal bleeding, unusual discharge, vaginal dryness  Musculoskeletal ROS: negative  Neurological ROS: no TIA or stroke symptoms  Dermatological ROS: negative       Objective:     Visit Vitals    BP (!) 153/104    Pulse 82    Ht 5' 5\" (1.651 m)    Wt 235 lb (106.6 kg)    LMP 03/03/2017 (Within Days)    BMI 39.11 kg/m2      Physical Exam:   General appearance - alert, well appearing, and in no distress, oriented to person, place, and time  Mental status - alert, oriented to person, place, and time, normal mood, behavior, speech, dress, motor activity, and thought processes  Neck:  No lymphadenopathy, no thyroid enlargement/tenderness  Respiratory:  Normal respiratory effort, no intercostal retractions or use of accessory muscles. Abdomen - soft, nontender, nondistended, no masses or organomegaly  Pelvic - No inguinal lymphadenopathy, normal urethral meatus and no bladder tenderness. VULVA: ulcerated lesion on posterior fourchette, painful to touch, HSV culture taken.     VAGINA: normal appearing vagina with normal color and discharge, no lesions,   PELVIC FLOOR EXAM: no cystocele, rectocele or prolapse noted  CERVIX: normal appearing cervix without discharge or lesions,   UTERUS: uterus is normal size, shape, consistency and nontender, mobile,   ADNEXA: normal adnexa in size, nontender and no masses. Extremities - No edema. Skin - normal coloration and turgor, no rashes, no suspicious skin lesions noted    Assessment/Plan:       ICD-10-CM ICD-9-CM    1. Acute cystitis without hematuria N30.00 595.0 AMB POC URINALYSIS DIP STICK MANUAL W/O MICRO      CULTURE, URINE      trimethoprim-sulfamethoxazole (BACTRIM DS, SEPTRA DS) 160-800 mg per tablet   2. Genital lesion, female N94.9 629.89 HSV CULTURE WITHOUT TYPING      CHLAMYDIA/NEISSERIA/TRICHOMONAS AMP      acyclovir (ZOVIRAX) 400 mg tablet   3. Family planning Z30.09 V25.09 norethindrone (MICRONOR) 0.35 mg tab     lab results and schedule of future lab studies reviewed with patient     Discussed with patient that our office will call for abnormal lab results. Normal results can be reviewed through [employer]. If patient has not received a phone call from our office or there are no results found on ensemblihart, the patient has been instructed to call our office to follow up on results. I have verbalized the plan of care with patient. The patient was given a full opportunity to ask questions, indicated that her questions had been answered and expressed understanding.

## 2017-06-13 NOTE — MR AVS SNAPSHOT
Visit Information Date & Time Provider Department Dept. Phone Encounter #  
 6/13/2017 11:30 AM Ariana Mcmanus, [address] [facility] [phone] [id number] Follow-up Instructions Return if symptoms worsen or fail to improve. Upcoming Health Maintenance Date Due INFLUENZA AGE 9 TO ADULT 8/1/2017 PAP AKA CERVICAL CYTOLOGY 5/15/2020 Allergies as of 6/13/2017  Review Complete On: 5/30/2017 By: Ariana Mcmanus, DO No Known Allergies Current Immunizations  Never Reviewed No immunizations on file. Not reviewed this visit You Were Diagnosed With   
  
 Codes Comments Acute cystitis without hematuria    -  Primary ICD-10-CM: N30.00 ICD-9-CM: 595.0 Genital lesion, female     ICD-10-CM: N94.9 ICD-9-CM: 629.89 Family planning     ICD-10-CM: Z30.09 
ICD-9-CM: V25.09 Vitals BP Pulse Height(growth percentile) Weight(growth percentile) LMP BMI  
 (!) 153/104 82 5' 5\" (1.651 m) 235 lb (106.6 kg) 03/03/2017 (Within Days) 39.11 kg/m2 OB Status Smoking Status Having regular periods Never Smoker Vitals History BMI and BSA Data Body Mass Index Body Surface Area  
 39.11 kg/m 2 2.21 m 2 Preferred Pharmacy Pharmacy Name Phone [facility] - [address], [address] [phone] Your Updated Medication List  
  
   
This list is accurate as of: 6/13/17 11:56 AM.  Always use your most recent med list.  
  
  
  
  
 acyclovir 400 mg tablet Commonly known as:  ZOVIRAX Take 1 Tab by mouth three (3) times daily for 10 days. amLODIPine 10 mg tablet Commonly known as:  Drea Punter Take 1 Tab by mouth daily. lisinopril 40 mg tablet Commonly known as:  Roxianne Daughters Take 1 Tab by mouth daily. norethindrone 0.35 mg Tab Commonly known as:  Devang & Devang Take 1 Tab by mouth daily. trimethoprim-sulfamethoxazole 160-800 mg per tablet Commonly known as:  BACTRIM DS, SEPTRA DS Take 1 Tab by mouth two (2) times a day for 3 days. Prescriptions Sent to Pharmacy Refills  
 acyclovir (ZOVIRAX) 400 mg tablet 0 Sig: Take 1 Tab by mouth three (3) times daily for 10 days. Class: Normal  
 Pharmacy: [facility] [address] [place] [address] [employer] , [address] [employer] Ph #: [phone] Route: Oral  
 trimethoprim-sulfamethoxazole (BACTRIM DS, SEPTRA DS) 160-800 mg per tablet 0 Sig: Take 1 Tab by mouth two (2) times a day for 3 days. Class: Normal  
 Pharmacy: [address] [place] [address] [employer], [address] [employer] Ph #: [phone] Route: Oral  
 norethindrone (MICRONOR) 0.35 mg tab 11 Sig: Take 1 Tab by mouth daily. Class: Normal  
 Pharmacy: [address] [place] [address] [employer], [address] [employer] Ph #: [phone] Route: Oral  
  
We Performed the Following AMB POC URINALYSIS DIP STICK MANUAL W/O MICRO [94294 CPT(R)] Follow-up Instructions Return if symptoms worsen or fail to improve. Patient Instructions Mini-Pills for Birth Control: Care Instructions Your Care Instructions Mini-pills are used to prevent pregnancy. They release a regular dose of the hormone progestin. They are different from regular combination birth control pills, which contain both progestin and estrogen. Mini-pills come in packs. Every mini-pill in the pack contains progestin. There are no sugar pills. So you have to take a pill every day at the same time to prevent pregnancy, even during your period. Follow-up care is a key part of your treatment and safety. Be sure to make and go to all appointments, and call your doctor if you are having problems. It's also a good idea to know your test results and keep a list of the medicines you take. How can you care for yourself at home? How do you take the mini-pill? · Follow your doctor's instructions about when to start taking your pills. It's best to take your first pill on the first day of your period. If you take the first pill another day, use backup birth control, such as a condom, or don't have intercourse for the next 48 hours (2 days). · Take every mini-pill in the pack, even during your period. Don't stop taking your pills if you have spotting between periods. · Take the pill at the same time every day. · Start your next pack the day after the last pack is finished. There is no break between packs. Always have your next pack of pills ready. What if you forget to take a pill? Always read the label for specific instructions, or call your doctor. Here are some basic guidelines: · If you take a pill more than 3 hours late, take it as soon as you remember, even if that means you will take 2 pills in one day. Then go back to your regular schedule. · Use backup birth control, such as a condom, or don't have intercourse for the next 48 hours to prevent pregnancy. · If you had intercourse within 5 days of forgetting to take the pill, you can use emergency contraception, such as the morning-after pill (Plan B). You can use emergency contraception for up to 5 days after having had sex, but it works best if you take it right away. What else do you need to know? · The mini-pill has side effects. ¨ You may have changes in your period and your period may stop. You may also have spotting or bleeding between periods. ¨ You may have mood changes, less interest in sex, or weight gain. · If you vomit or have diarrhea soon after taking a pill, use a backup method or don't have intercourse for 7 days. · Check with your doctor before you use any other medicines, including over-the-counter medicines, vitamins, herbal products, and supplements. Birth control hormones may not work as well to prevent pregnancy when combined with other medicines.  
· The mini-pill doesn't protect against sexually transmitted infections (STIs), such as herpes or HIV/AIDS. If you're not sure whether your sex partner might have an STI, use a condom to protect against disease. When should you call for help? Call your doctor now or seek immediate medical care if: 
· You have severe belly pain. Watch closely for changes in your health, and be sure to contact your doctor if: 
· You think you may be pregnant. · You have any problems with your birth control. · You feel you may be depressed. · You regularly have spotting. · You think you may have been exposed to or have a sexually transmitted infection. Where can you learn more? Go to http://kerry-chucho.info/. Enter T269 in the search box to learn more about \"Mini-Pills for Birth Control: Care Instructions. \" Current as of: May 30, 2016 Content Version: 11.2 © 1292-1046 [employer]. Care instructions adapted under license by [employer] (which disclaims liability or warranty for this information). If you have questions about a medical condition or this instruction, always ask your healthcare professional. [place] [address] any warranty or liability for your use of this information. Urinary Tract Infection in Women: Care Instructions Your Care Instructions A urinary tract infection, or UTI, is a general term for an infection anywhere between the kidneys and the urethra (where urine comes out). Most UTIs are bladder infections. They often cause pain or burning when you urinate. UTIs are caused by bacteria and can be cured with antibiotics. Be sure to complete your treatment so that the infection goes away. Follow-up care is a key part of your treatment and safety. Be sure to make and go to all appointments, and call your doctor if you are having problems. It's also a good idea to know your test results and keep a list of the medicines you take. How can you care for yourself at home? · Take your antibiotics as directed. Do not stop taking them just because you feel better. You need to take the full course of antibiotics. · Drink extra water and other fluids for the next day or two. This may help wash out the bacteria that are causing the infection. (If you have kidney, heart, or liver disease and have to limit fluids, talk with your doctor before you increase your fluid intake.) · Avoid drinks that are carbonated or have caffeine. They can irritate the bladder. · Urinate often. Try to empty your bladder each time. · To relieve pain, take a hot bath or lay a heating pad set on low over your lower belly or genital area. Never go to sleep with a heating pad in place. To prevent UTIs · Drink plenty of water each day. This helps you urinate often, which clears bacteria from your system. (If you have kidney, heart, or liver disease and have to limit fluids, talk with your doctor before you increase your fluid intake.) · Urinate when you need to. · Urinate right after you have sex. · Change sanitary pads often. · Avoid douches, bubble baths, feminine hygiene sprays, and other feminine hygiene products that have deodorants. · After going to the bathroom, wipe from front to back. When should you call for help? Call your doctor now or seek immediate medical care if: · Symptoms such as fever, chills, nausea, or vomiting get worse or appear for the first time. · You have new pain in your back just below your rib cage. This is called flank pain. · There is new blood or pus in your urine. · You have any problems with your antibiotic medicine. Watch closely for changes in your health, and be sure to contact your doctor if: 
· You are not getting better after taking an antibiotic for 2 days. · Your symptoms go away but then come back. Where can you learn more? Go to http://kerry-chucho.info/.  
Enter U135 in the search box to learn more about \"Urinary Tract Infection in Women: Care Instructions. \" Current as of: November 28, 2016 Content Version: 11.2 © 5668-5801 [employer]. Care instructions adapted under license by [employer] (which disclaims liability or warranty for this information). If you have questions about a medical condition or this instruction, always ask your healthcare professional. [address]  any warranty or liability for your use of this information. Introducing [facility]! [employer] introduces [employer] patient portal. Now you can access parts of your medical record, email your doctor's office, and request medication refills online. 1. In your internet browser, go to [email or web address] 2. Click on the First Time User? Click Here link in the Sign In box. You will see the New Member Sign Up page. 3. Enter your [employer] Access Code exactly as it appears below. You will not need to use this code after youve completed the sign-up process. If you do not sign up before the expiration date, you must request a new code. · [employer] Access Code: [email or web address] Expires: 8/13/2017 12:20 PM 
 
4. Enter the last four digits of your Social Security Number (xxxx) and Date of Birth (mm/dd/yyyy) as indicated and click Submit. You will be taken to the next sign-up page. 5. Create a [employer] ID. This will be your [employer] login ID and cannot be changed, so think of one that is secure and easy to remember. 6. Create a [employer] password. You can change your password at any time. 7. Enter your Password Reset Question and Answer. This can be used at a later time if you forget your password. 8. Enter your e-mail address. You will receive e-mail notification when new information is available in [address] 9. Click Sign Up. You can now view and download portions of your medical record. 10. Click the Download Summary menu link to download a portable copy of your medical information. If you have questions, please visit the Frequently Asked Questions section of the [employer] website. Remember, [employer] is NOT to be used for urgent needs. For medical emergencies, dial 911. Now available from your iPhone and Android! Please provide this summary of care documentation to your next provider. Your primary care clinician is listed as NONE. If you have any questions after today's visit, please call [phone].

## 2017-06-14 LAB
C TRACH RRNA SPEC QL NAA+PROBE: NEGATIVE
N GONORRHOEA RRNA SPEC QL NAA+PROBE: NEGATIVE
SPECIMEN SOURCE: NORMAL
T VAGINALIS RRNA SPEC QL NAA+PROBE: NEGATIVE

## 2017-06-15 LAB
BACTERIA SPEC CULT: ABNORMAL
HSV SPEC CULT: NEGATIVE
SERVICE CMNT-IMP: ABNORMAL
SPECIMEN SOURCE: NORMAL

## 2017-06-16 ENCOUNTER — TELEPHONE (OUTPATIENT)
Dept: OBGYN CLINIC | Age: 42
End: 2017-06-16

## 2017-06-16 NOTE — PROGRESS NOTES
+urine cx. Patient already on appropriate abx. HSV culture neg. I recommend continuing acyclovir and completing full course due to high suspicion for HSV lesion. If any lesions recur, will re-culture.

## 2017-06-26 ENCOUNTER — TELEPHONE (OUTPATIENT)
Dept: OBGYN CLINIC | Age: 42
End: 2017-06-26

## 2017-07-13 ENCOUNTER — OFFICE VISIT (OUTPATIENT)
Dept: FAMILY MEDICINE CLINIC | Age: 42
End: 2017-07-13

## 2017-07-13 VITALS
OXYGEN SATURATION: 99 % | SYSTOLIC BLOOD PRESSURE: 144 MMHG | TEMPERATURE: 98 F | BODY MASS INDEX: 39.82 KG/M2 | HEIGHT: 65 IN | DIASTOLIC BLOOD PRESSURE: 98 MMHG | HEART RATE: 58 BPM | WEIGHT: 239 LBS | RESPIRATION RATE: 20 BRPM

## 2017-07-13 DIAGNOSIS — E66.9 OBESITY, UNSPECIFIED OBESITY SEVERITY, UNSPECIFIED OBESITY TYPE: ICD-10-CM

## 2017-07-13 DIAGNOSIS — I10 ESSENTIAL HYPERTENSION: Primary | ICD-10-CM

## 2017-07-13 RX ORDER — HYDROCHLOROTHIAZIDE 12.5 MG/1
12.5 CAPSULE ORAL DAILY
COMMUNITY
End: 2017-07-13 | Stop reason: SDUPTHER

## 2017-07-13 RX ORDER — HYDROCHLOROTHIAZIDE 12.5 MG/1
12.5 CAPSULE ORAL DAILY
Qty: 30 CAP | Refills: 2 | Status: SHIPPED | OUTPATIENT
Start: 2017-07-13 | End: 2017-10-12 | Stop reason: DRUGHIGH

## 2017-07-13 NOTE — PATIENT INSTRUCTIONS
The doctor would like you to complete your mammogram and pap screening exams. You may contact [facility] for a  Free Mammogram.    To schedule an appointment call  [phone]  and you will be referred to the [facility] [address] [facility]  nearest you. At your appointment provide the  [address] [place] [address] as your primary care physician and they will forward your test results for inclusion in your medical records          DASH Diet: Care Instructions  Your Care Instructions  The DASH diet is an eating plan that can help lower your blood pressure. DASH stands for Dietary Approaches to Stop Hypertension. Hypertension is high blood pressure. The DASH diet focuses on eating foods that are high in calcium, potassium, and magnesium. These nutrients can lower blood pressure. The foods that are highest in these nutrients are fruits, vegetables, low-fat dairy products, nuts, seeds, and legumes. But taking calcium, potassium, and magnesium supplements instead of eating foods that are high in those nutrients does not have the same effect. The DASH diet also includes whole grains, fish, and poultry. The DASH diet is one of several lifestyle changes your doctor may recommend to lower your high blood pressure. Your doctor may also want you to decrease the amount of sodium in your diet. Lowering sodium while following the DASH diet can lower blood pressure even further than just the DASH diet alone. Follow-up care is a key part of your treatment and safety. Be sure to make and go to all appointments, and call your doctor if you are having problems. It's also a good idea to know your test results and keep a list of the medicines you take. How can you care for yourself at home? Following the DASH diet  · Eat 4 to 5 servings of fruit each day. A serving is 1 medium-sized piece of fruit, ½ cup chopped or canned fruit, 1/4 cup dried fruit, or 4 ounces (½ cup) of fruit juice. Choose fruit more often than fruit juice.   · Eat 4 to 5 servings of vegetables each day. A serving is 1 cup of lettuce or raw leafy vegetables, ½ cup of chopped or cooked vegetables, or 4 ounces (½ cup) of vegetable juice. Choose vegetables more often than vegetable juice. · Get 2 to 3 servings of low-fat and fat-free dairy each day. A serving is 8 ounces of milk, 1 cup of yogurt, or 1 ½ ounces of cheese. · Eat 6 to 8 servings of grains each day. A serving is 1 slice of bread, 1 ounce of dry cereal, or ½ cup of cooked rice, pasta, or cooked cereal. Try to choose whole-grain products as much as possible. · Limit lean meat, poultry, and fish to 2 servings each day. A serving is 3 ounces, about the size of a deck of cards. · Eat 4 to 5 servings of nuts, seeds, and legumes (cooked dried beans, lentils, and split peas) each week. A serving is 1/3 cup of nuts, 2 tablespoons of seeds, or ½ cup of cooked beans or peas. · Limit fats and oils to 2 to 3 servings each day. A serving is 1 teaspoon of vegetable oil or 2 tablespoons of salad dressing. · Limit sweets and added sugars to 5 servings or less a week. A serving is 1 tablespoon jelly or jam, ½ cup sorbet, or 1 cup of lemonade. · Eat less than 2,300 milligrams (mg) of sodium a day. If you limit your sodium to 1,500 mg a day, you can lower your blood pressure even more. Tips for success  · Start small. Do not try to make dramatic changes to your diet all at once. You might feel that you are missing out on your favorite foods and then be more likely to not follow the plan. Make small changes, and stick with them. Once those changes become habit, add a few more changes. · Try some of the following:  ¨ Make it a goal to eat a fruit or vegetable at every meal and at snacks. This will make it easy to get the recommended amount of fruits and vegetables each day. ¨ Try yogurt topped with fruit and nuts for a snack or healthy dessert. ¨ Add lettuce, tomato, cucumber, and onion to sandwiches.   ¨ Combine a ready-made pizza crust with low-fat mozzarella cheese and lots of vegetable toppings. Try using tomatoes, squash, spinach, broccoli, carrots, cauliflower, and onions. ¨ Have a variety of cut-up vegetables with a low-fat dip as an appetizer instead of chips and dip. ¨ Sprinkle sunflower seeds or chopped almonds over salads. Or try adding chopped walnuts or almonds to cooked vegetables. ¨ Try some vegetarian meals using beans and peas. Add garbanzo or kidney beans to salads. Make burritos and tacos with mashed orozco beans or black beans. Where can you learn more? Go to http://kerry-chucho.info/. Enter K104 in the search box to learn more about \"DASH Diet: Care Instructions. \"  Current as of: April 3, 2017  Content Version: 11.3  © 0979-7459 [employer]. Care instructions adapted under license by [employer] (which disclaims liability or warranty for this information). If you have questions about a medical condition or this instruction, always ask your healthcare professional. [place] [address] any warranty or liability for your use of this information.

## 2017-07-13 NOTE — PROGRESS NOTES
Discharge instructions reviewed with patient    Medication list and understanding of medications reviewed with patient. OTC and herbal medications reviewed and added to med list if applicable  Barriers to adherence assessed. Guidance given regarding new medications this visit, including reason for taking this medicine, and common side effects. AVS given to pt. Pt will call to schedule f/u appt.

## 2017-07-13 NOTE — PROGRESS NOTES
No chief complaint on file. 1. Have you been to the ER, urgent care clinic since your last visit? Hospitalized since your last visit? No    2. Have you seen or consulted any other health care providers outside of the [address] [place] [address] since your last visit? No    3. When was your last Pap smear? Up to date  When was your last Mammogram? No history of MAMMO referred to [facility]  When was your last Colon screening? N/A    PMH/FH/Social Hx reviewed and updated as needed      Applicable screenings reviewed and updated as needed  Medication reconciliation performed. Patient does need medication refills. Health Maintenance reviewed.

## 2017-07-13 NOTE — PROGRESS NOTES
NAVIN Buckner is a 40 y.o. female being seen today for   Chief Complaint   Patient presents with    Medication Refill     HTN medicaiton    . she states that she needs refill bp med    Past Medical History:   Diagnosis Date    Amenorrhea 1/23/2012    Endometrial hyperplasia without atypia, simple 2/17/2012    Genital warts 6/13/2017    Hypertension 12/2011    Obesity 3/5/2014         ROS  Patient states that she is feeling well. Denies complaints of chest pain, shortness of breath, swelling of legs, dizziness or weakness. she denies nausea, vomiting or diarrhea. Current Outpatient Prescriptions   Medication Sig    hydroCHLOROthiazide (MICROZIDE) 12.5 mg capsule Take 1 Cap by mouth daily.  norethindrone (MICRONOR) 0.35 mg tab Take 1 Tab by mouth daily.  amLODIPine (NORVASC) 10 mg tablet Take 1 Tab by mouth daily.  lisinopril (PRINIVIL, ZESTRIL) 40 mg tablet Take 1 Tab by mouth daily. No current facility-administered medications for this visit. PE  Visit Vitals    BP (!) 144/98 (BP 1 Location: Left arm, BP Patient Position: Sitting)    Pulse (!) 58    Temp 98 °F (36.7 °C) (Oral)    Resp 20    Ht 5' 5\" (1.651 m)    Wt 239 lb (108.4 kg)    LMP 07/06/2017 (Exact Date)    SpO2 99%    BMI 39.77 kg/m2        Alert and oriented with normal mood and affect. she is well developed and well nourished . Lungs are clear without wheezing. Heart rate is regular without murmurs or gallops. There is no lower extremity edema. Assessment and Plan:        ICD-10-CM ICD-9-CM    1. Essential hypertension I10 401.9    2.  Obesity, unspecified obesity severity, unspecified obesity type E66.9 278.00      Refill hctz  Follow up 1-2 mos recheck bp with labs      Isaiah Velazquez MD

## 2017-07-13 NOTE — MR AVS SNAPSHOT
Visit Information Date & Time Provider Department Dept. Phone Encounter #  
 7/13/2017  9:45 AM Corinne Junior [id number] Upcoming Health Maintenance Date Due DTaP/Tdap/Td series (1 - Tdap) 6/7/1997 INFLUENZA AGE 9 TO ADULT 8/1/2017 PAP AKA CERVICAL CYTOLOGY 5/15/2020 Allergies as of 7/13/2017  Review Complete On: 7/13/2017 By: Michel Davidson No Known Allergies Current Immunizations  Never Reviewed No immunizations on file. Not reviewed this visit You Were Diagnosed With   
  
 Codes Comments Essential hypertension    -  Primary ICD-10-CM: I10 
ICD-9-CM: 401.9 Obesity, unspecified obesity severity, unspecified obesity type     ICD-10-CM: E66.9 ICD-9-CM: 278.00 Vitals BP Pulse Temp Resp Height(growth percentile) Weight(growth percentile) (!) 144/98 (BP 1 Location: Left arm, BP Patient Position: Sitting) (!) 58 98 °F (36.7 °C) (Oral) 20 5' 5\" (1.651 m) 239 lb (108.4 kg) LMP SpO2 BMI OB Status Smoking Status 07/06/2017 (Exact Date) 99% 39.77 kg/m2 Having regular periods Never Smoker BMI and BSA Data Body Mass Index Body Surface Area  
 39.77 kg/m 2 2.23 m 2 Preferred Pharmacy Pharmacy Name Phone [facility] - [address], [address] [phone] Your Updated Medication List  
  
   
This list is accurate as of: 7/13/17 10:39 AM.  Always use your most recent med list. amLODIPine 10 mg tablet Commonly known as:  Caesar Vivas Take 1 Tab by mouth daily. hydroCHLOROthiazide 12.5 mg capsule Commonly known as:  Luz Maria Ayala Take 1 Cap by mouth daily. lisinopril 40 mg tablet Commonly known as:  Velora Magan Take 1 Tab by mouth daily. norethindrone 0.35 mg Tab Commonly known as:  Devang & Devang Take 1 Tab by mouth daily. Prescriptions Sent to Pharmacy Refills hydroCHLOROthiazide (MICROZIDE) 12.5 mg capsule 2 Sig: Take 1 Cap by mouth daily. Class: Normal  
 Pharmacy: [id number] [facility] #: [phone] Route: Oral  
  
Patient Instructions The doctor would like you to complete your mammogram and pap screening exams. You may contact [facility] for a  Free Mammogram. 
 
To schedule an appointment call  [phone]  and you will be referred to the [facility] [address] [facility]  nearest you. At your appointment provide the  [address] [place] [address] as your primary care physician and they will forward your test results for inclusion in your medical records DASH Diet: Care Instructions Your Care Instructions The DASH diet is an eating plan that can help lower your blood pressure. DASH stands for Dietary Approaches to Stop Hypertension. Hypertension is high blood pressure. The DASH diet focuses on eating foods that are high in calcium, potassium, and magnesium. These nutrients can lower blood pressure. The foods that are highest in these nutrients are fruits, vegetables, low-fat dairy products, nuts, seeds, and legumes. But taking calcium, potassium, and magnesium supplements instead of eating foods that are high in those nutrients does not have the same effect. The DASH diet also includes whole grains, fish, and poultry. The DASH diet is one of several lifestyle changes your doctor may recommend to lower your high blood pressure. Your doctor may also want you to decrease the amount of sodium in your diet. Lowering sodium while following the DASH diet can lower blood pressure even further than just the DASH diet alone. Follow-up care is a key part of your treatment and safety. Be sure to make and go to all appointments, and call your doctor if you are having problems. It's also a good idea to know your test results and keep a list of the medicines you take. How can you care for yourself at home? Following the DASH diet · Eat 4 to 5 servings of fruit each day. A serving is 1 medium-sized piece of fruit, ½ cup chopped or canned fruit, 1/4 cup dried fruit, or 4 ounces (½ cup) of fruit juice. Choose fruit more often than fruit juice. · Eat 4 to 5 servings of vegetables each day. A serving is 1 cup of lettuce or raw leafy vegetables, ½ cup of chopped or cooked vegetables, or 4 ounces (½ cup) of vegetable juice. Choose vegetables more often than vegetable juice. · Get 2 to 3 servings of low-fat and fat-free dairy each day. A serving is 8 ounces of milk, 1 cup of yogurt, or 1 ½ ounces of cheese. · Eat 6 to 8 servings of grains each day. A serving is 1 slice of bread, 1 ounce of dry cereal, or ½ cup of cooked rice, pasta, or cooked cereal. Try to choose whole-grain products as much as possible. · Limit lean meat, poultry, and fish to 2 servings each day. A serving is 3 ounces, about the size of a deck of cards. · Eat 4 to 5 servings of nuts, seeds, and legumes (cooked dried beans, lentils, and split peas) each week. A serving is 1/3 cup of nuts, 2 tablespoons of seeds, or ½ cup of cooked beans or peas. · Limit fats and oils to 2 to 3 servings each day. A serving is 1 teaspoon of vegetable oil or 2 tablespoons of salad dressing. · Limit sweets and added sugars to 5 servings or less a week. A serving is 1 tablespoon jelly or jam, ½ cup sorbet, or 1 cup of lemonade. · Eat less than 2,300 milligrams (mg) of sodium a day. If you limit your sodium to 1,500 mg a day, you can lower your blood pressure even more. Tips for success · Start small. Do not try to make dramatic changes to your diet all at once. You might feel that you are missing out on your favorite foods and then be more likely to not follow the plan. Make small changes, and stick with them. Once those changes become habit, add a few more changes. · Try some of the following: ¨ Make it a goal to eat a fruit or vegetable at every meal and at snacks. This will make it easy to get the recommended amount of fruits and vegetables each day. ¨ Try yogurt topped with fruit and nuts for a snack or healthy dessert. ¨ Add lettuce, tomato, cucumber, and onion to sandwiches. ¨ Combine a ready-made pizza crust with low-fat mozzarella cheese and lots of vegetable toppings. Try using tomatoes, squash, spinach, broccoli, carrots, cauliflower, and onions. ¨ Have a variety of cut-up vegetables with a low-fat dip as an appetizer instead of chips and dip. ¨ Sprinkle sunflower seeds or chopped almonds over salads. Or try adding chopped walnuts or almonds to cooked vegetables. ¨ Try some vegetarian meals using beans and peas. Add garbanzo or kidney beans to salads. Make burritos and tacos with mashed orozco beans or black beans. Where can you learn more? Go to http://kerry-chucho.info/. Enter B687 in the search box to learn more about \"DASH Diet: Care Instructions. \" Current as of: April 3, 2017 Content Version: 11.3 © 6677-8785 [employer]. Care instructions adapted under license by [employer] (which disclaims liability or warranty for this information). If you have questions about a medical condition or this instruction, always ask your healthcare professional. [place] [address] any warranty or liability for your use of this information. Introducing [address]! [facility] introduces [employer] patient portal. Now you can access parts of your medical record, email your doctor's office, and request medication refills online. 1. In your internet browser, go to [email or web address] 2. Click on the First Time User? Click Here link in the Sign In box. You will see the New Member Sign Up page. 3. Enter your [employer] Access Code exactly as it appears below. You will not need to use this code after youve completed the sign-up process.  If you do not sign up before the expiration date, you must request a new code. · [employer] Access Code: [email or web address] Expires: 8/13/2017 12:20 PM 
 
4. Enter the last four digits of your Social Security Number (xxxx) and Date of Birth (mm/dd/yyyy) as indicated and click Submit. You will be taken to the next sign-up page. 5. Create a [employer] ID. This will be your [employer] login ID and cannot be changed, so think of one that is secure and easy to remember. 6. Create a [employer] password. You can change your password at any time. 7. Enter your Password Reset Question and Answer. This can be used at a later time if you forget your password. 8. Enter your e-mail address. You will receive e-mail notification when new information is available in [address] 9. Click Sign Up. You can now view and download portions of your medical record. 10. Click the Download Summary menu link to download a portable copy of your medical information. If you have questions, please visit the Frequently Asked Questions section of the [employer] website. Remember, [employer] is NOT to be used for urgent needs. For medical emergencies, dial 911. Now available from your iPhone and Android! Please provide this summary of care documentation to your next provider. Your primary care clinician is listed as NONE. If you have any questions after today's visit, please call [phone].

## 2017-10-12 ENCOUNTER — HOSPITAL ENCOUNTER (OUTPATIENT)
Dept: LAB | Age: 42
Discharge: HOME | End: 2017-10-12

## 2017-10-12 ENCOUNTER — OFFICE VISIT (OUTPATIENT)
Dept: FAMILY MEDICINE CLINIC | Age: 42
End: 2017-10-12

## 2017-10-12 VITALS
OXYGEN SATURATION: 99 % | DIASTOLIC BLOOD PRESSURE: 99 MMHG | TEMPERATURE: 98 F | WEIGHT: 234 LBS | HEIGHT: 65 IN | HEART RATE: 58 BPM | SYSTOLIC BLOOD PRESSURE: 158 MMHG | BODY MASS INDEX: 38.99 KG/M2 | RESPIRATION RATE: 22 BRPM

## 2017-10-12 DIAGNOSIS — R73.03 PRE-DIABETES: Primary | ICD-10-CM

## 2017-10-12 DIAGNOSIS — E78.2 MIXED HYPERLIPIDEMIA: ICD-10-CM

## 2017-10-12 DIAGNOSIS — I10 ESSENTIAL HYPERTENSION: ICD-10-CM

## 2017-10-12 LAB
ALBUMIN SERPL-MCNC: 3.7 G/DL (ref 3.4–5)
ALBUMIN/GLOB SERPL: 1.1 {RATIO} (ref 0.8–1.7)
ALP SERPL-CCNC: 87 U/L (ref 45–117)
ALT SERPL-CCNC: 15 U/L (ref 13–56)
ANION GAP SERPL CALC-SCNC: 6 MMOL/L (ref 3–18)
AST SERPL-CCNC: 11 U/L (ref 15–37)
BILIRUB SERPL-MCNC: 0.3 MG/DL (ref 0.2–1)
BUN SERPL-MCNC: 9 MG/DL (ref 7–18)
BUN/CREAT SERPL: 12 (ref 12–20)
CALCIUM SERPL-MCNC: 9.3 MG/DL (ref 8.5–10.1)
CHLORIDE SERPL-SCNC: 104 MMOL/L (ref 100–108)
CHOLEST SERPL-MCNC: 217 MG/DL
CO2 SERPL-SCNC: 30 MMOL/L (ref 21–32)
CREAT SERPL-MCNC: 0.77 MG/DL (ref 0.6–1.3)
GLOBULIN SER CALC-MCNC: 3.5 G/DL (ref 2–4)
GLUCOSE SERPL-MCNC: 79 MG/DL (ref 74–99)
HDLC SERPL-MCNC: 48 MG/DL (ref 40–60)
HDLC SERPL: 4.5 {RATIO} (ref 0–5)
LDLC SERPL CALC-MCNC: 154.4 MG/DL (ref 0–100)
LIPID PROFILE,FLP: ABNORMAL
POTASSIUM SERPL-SCNC: 4.3 MMOL/L (ref 3.5–5.5)
PROT SERPL-MCNC: 7.2 G/DL (ref 6.4–8.2)
SODIUM SERPL-SCNC: 140 MMOL/L (ref 136–145)
TRIGL SERPL-MCNC: 73 MG/DL (ref ?–150)
VLDLC SERPL CALC-MCNC: 14.6 MG/DL

## 2017-10-12 PROCEDURE — 80053 COMPREHEN METABOLIC PANEL: CPT | Performed by: NURSE PRACTITIONER

## 2017-10-12 PROCEDURE — 80061 LIPID PANEL: CPT | Performed by: NURSE PRACTITIONER

## 2017-10-12 RX ORDER — LISINOPRIL AND HYDROCHLOROTHIAZIDE 10; 12.5 MG/1; MG/1
1 TABLET ORAL DAILY
Qty: 30 TAB | Refills: 5 | Status: SHIPPED | OUTPATIENT
Start: 2017-10-12 | End: 2018-05-10 | Stop reason: SDUPTHER

## 2017-10-12 NOTE — MR AVS SNAPSHOT
Visit Information Date & Time Provider Department Dept. Phone Encounter #  
 10/12/2017 10:30 AM Karyle Abed, NP Carin Morocho [id number] Upcoming Health Maintenance Date Due DTaP/Tdap/Td series (1 - Tdap) 6/7/1997 INFLUENZA AGE 9 TO ADULT 8/1/2017 PAP AKA CERVICAL CYTOLOGY 5/15/2020 Allergies as of 10/12/2017  Review Complete On: 7/13/2017 By: Chinmay Calixto No Known Allergies Current Immunizations  Never Reviewed No immunizations on file. Not reviewed this visit You Were Diagnosed With   
  
 Codes Comments Pre-diabetes    -  Primary ICD-10-CM: R73.03 
ICD-9-CM: 790.29 Essential hypertension     ICD-10-CM: I10 
ICD-9-CM: 401.9 Mixed hyperlipidemia     ICD-10-CM: E78.2 ICD-9-CM: 272.2 Vitals BP Pulse Temp Resp Height(growth percentile) Weight(growth percentile) (!) 158/99 (BP 1 Location: Left arm, BP Patient Position: Sitting) (!) 58 98 °F (36.7 °C) (Oral) 22 5' 5\" (1.651 m) 234 lb (106.1 kg) LMP SpO2 BMI OB Status Smoking Status 09/25/2017 (Approximate) 99% 38.94 kg/m2 Having regular periods Never Smoker BMI and BSA Data Body Mass Index Body Surface Area 38.94 kg/m 2 2.21 m 2 Preferred Pharmacy Pharmacy Name Phone [facility] - [address], [address] [phone] Your Updated Medication List  
  
   
This list is accurate as of: 10/12/17 11:44 AM.  Always use your most recent med list.  
  
  
  
  
 lisinopril-hydroCHLOROthiazide 10-12.5 mg per tablet Commonly known as:  Katrinka Fat Take 1 Tab by mouth daily. norethindrone 0.35 mg Tab Commonly known as:  Devang & Devang Take 1 Tab by mouth daily. Prescriptions Sent to Pharmacy Refills  
 lisinopril-hydroCHLOROthiazide (PRINZIDE, ZESTORETIC) 10-12.5 mg per tablet 5 Sig: Take 1 Tab by mouth daily.   
 Class: Normal  
 [facility] [id number]  [facility] [phone] Route: Oral  
  
Patient Instructions The doctor would like you to complete your mammogram. 
 
You may contact [facility] for a  Free Mammogram. 
 
To schedule an appointment call  [phone]  and you will be referred to the [facility] [address] [facility]  nearest you. At your appointment provide the  [address] [place] [address] as your primary care physician and they will forward your test results for inclusion in your medical records DASH Diet: Care Instructions Your Care Instructions The DASH diet is an eating plan that can help lower your blood pressure. DASH stands for Dietary Approaches to Stop Hypertension. Hypertension is high blood pressure. The DASH diet focuses on eating foods that are high in calcium, potassium, and magnesium. These nutrients can lower blood pressure. The foods that are highest in these nutrients are fruits, vegetables, low-fat dairy products, nuts, seeds, and legumes. But taking calcium, potassium, and magnesium supplements instead of eating foods that are high in those nutrients does not have the same effect. The DASH diet also includes whole grains, fish, and poultry. The DASH diet is one of several lifestyle changes your doctor may recommend to lower your high blood pressure. Your doctor may also want you to decrease the amount of sodium in your diet. Lowering sodium while following the DASH diet can lower blood pressure even further than just the DASH diet alone. Follow-up care is a key part of your treatment and safety. Be sure to make and go to all appointments, and call your doctor if you are having problems. It's also a good idea to know your test results and keep a list of the medicines you take. How can you care for yourself at home? Following the DASH diet · Eat 4 to 5 servings of fruit each day.  A serving is 1 medium-sized piece of fruit, ½ cup chopped or canned fruit, 1/4 cup dried fruit, or 4 ounces (½ cup) of fruit juice. Choose fruit more often than fruit juice. · Eat 4 to 5 servings of vegetables each day. A serving is 1 cup of lettuce or raw leafy vegetables, ½ cup of chopped or cooked vegetables, or 4 ounces (½ cup) of vegetable juice. Choose vegetables more often than vegetable juice. · Get 2 to 3 servings of low-fat and fat-free dairy each day. A serving is 8 ounces of milk, 1 cup of yogurt, or 1 ½ ounces of cheese. · Eat 6 to 8 servings of grains each day. A serving is 1 slice of bread, 1 ounce of dry cereal, or ½ cup of cooked rice, pasta, or cooked cereal. Try to choose whole-grain products as much as possible. · Limit lean meat, poultry, and fish to 2 servings each day. A serving is 3 ounces, about the size of a deck of cards. · Eat 4 to 5 servings of nuts, seeds, and legumes (cooked dried beans, lentils, and split peas) each week. A serving is 1/3 cup of nuts, 2 tablespoons of seeds, or ½ cup of cooked beans or peas. · Limit fats and oils to 2 to 3 servings each day. A serving is 1 teaspoon of vegetable oil or 2 tablespoons of salad dressing. · Limit sweets and added sugars to 5 servings or less a week. A serving is 1 tablespoon jelly or jam, ½ cup sorbet, or 1 cup of lemonade. · Eat less than 2,300 milligrams (mg) of sodium a day. If you limit your sodium to 1,500 mg a day, you can lower your blood pressure even more. Tips for success · Start small. Do not try to make dramatic changes to your diet all at once. You might feel that you are missing out on your favorite foods and then be more likely to not follow the plan. Make small changes, and stick with them. Once those changes become habit, add a few more changes. · Try some of the following: ¨ Make it a goal to eat a fruit or vegetable at every meal and at snacks. This will make it easy to get the recommended amount of fruits and vegetables each day. ¨ Try yogurt topped with fruit and nuts for a snack or healthy dessert. ¨ Add lettuce, tomato, cucumber, and onion to sandwiches. ¨ Combine a ready-made pizza crust with low-fat mozzarella cheese and lots of vegetable toppings. Try using tomatoes, squash, spinach, broccoli, carrots, cauliflower, and onions. ¨ Have a variety of cut-up vegetables with a low-fat dip as an appetizer instead of chips and dip. ¨ Sprinkle sunflower seeds or chopped almonds over salads. Or try adding chopped walnuts or almonds to cooked vegetables. ¨ Try some vegetarian meals using beans and peas. Add garbanzo or kidney beans to salads. Make burritos and tacos with mashed orozco beans or black beans. Where can you learn more? Go to http://kerryCymbetchucho.info/. Enter D403 in the search box to learn more about \"DASH Diet: Care Instructions. \" Current as of: April 3, 2017 Content Version: 11.3 © 1048-8592 [employer]. Care instructions adapted under license by [employer] (which disclaims liability or warranty for this information). If you have questions about a medical condition or this instruction, always ask your healthcare professional. [address] any warranty or liability for your use of this information. Introducing \[facility]\"" & HEALTH SERVICES! Marianela Ashby introduces [employer] patient portal. Now you can access parts of your medical record, email your doctor's office, and request medication refills online. 1. In your internet browser, go to [email or web address] "[email or web address]"/[email or web address] 2. Click on the First Time User? Click Here link in the Sign In box. You will see the New Member Sign Up page. 3. Enter your [employer] Access Code exactly as it appears below. You will not need to use this code after youve completed the sign-up process. If you do not sign up before the expiration date, you must request a new code. · [employer] Access Code: [facility] Expires: 1/10/2018 11:44 AM 
 
4. Enter the last four digits of your Social Security Number (xxxx) and Date of Birth (mm/dd/yyyy) as indicated and click Submit. You will be taken to the next sign-up page. 5. Create a [employer] ID. This will be your [employer] login ID and cannot be changed, so think of one that is secure and easy to remember. 6. Create a [employer] password. You can change your password at any time. 7. Enter your Password Reset Question and Answer. This can be used at a later time if you forget your password. 8. Enter your e-mail address. You will receive e-mail notification when new information is available in [address] 9. Click Sign Up. You can now view and download portions of your medical record. 10. Click the Download Summary menu link to download a portable copy of your medical information. If you have questions, please visit the Frequently Asked Questions section of the [employer] website. Remember, [employer] is NOT to be used for urgent needs. For medical emergencies, dial 911. Now available from your iPhone and Android! Please provide this summary of care documentation to your next provider. Your primary care clinician is listed as NONE. If you have any questions after today's visit, please call [phone].

## 2017-10-12 NOTE — PROGRESS NOTES
No chief complaint on file. 1. Have you been to the ER, urgent care clinic since your last visit? Hospitalized since your last visit? No    2. Have you seen or consulted any other health care providers outside of the [address] [place] [address] since your last visit? NO  3. When was your last Pap smear? Up to date  When was your last Mammogram?  [facility] info given  When was your last Colon screening?N./A    PMH/FH/Social Hx reviewed and updated as needed      Applicable screenings reviewed and updated as needed  Medication reconciliation performed. Patient does need medication refills. Health Maintenance reviewed.

## 2017-10-12 NOTE — PROGRESS NOTES
Discharge instructions reviewed with patient    Medication list and understanding of medications reviewed with patient. OTC and herbal medications reviewed and added to med list if applicable  Barriers to adherence assessed. Guidance given regarding new medications this visit, including reason for taking this medicine, and common side effects. Given AVS after labs drawn.

## 2017-10-12 NOTE — PATIENT INSTRUCTIONS
The doctor would like you to complete your mammogram.    You may contact [facility] for a  Free Mammogram.    To schedule an appointment call  [phone]  and you will be referred to the [facility]  nearest you. At your appointment provide the  [address] [place] [address] as your primary care physician and they will forward your test results for inclusion in your medical records            DASH Diet: Care Instructions  Your Care Instructions  The DASH diet is an eating plan that can help lower your blood pressure. DASH stands for Dietary Approaches to Stop Hypertension. Hypertension is high blood pressure. The DASH diet focuses on eating foods that are high in calcium, potassium, and magnesium. These nutrients can lower blood pressure. The foods that are highest in these nutrients are fruits, vegetables, low-fat dairy products, nuts, seeds, and legumes. But taking calcium, potassium, and magnesium supplements instead of eating foods that are high in those nutrients does not have the same effect. The DASH diet also includes whole grains, fish, and poultry. The DASH diet is one of several lifestyle changes your doctor may recommend to lower your high blood pressure. Your doctor may also want you to decrease the amount of sodium in your diet. Lowering sodium while following the DASH diet can lower blood pressure even further than just the DASH diet alone. Follow-up care is a key part of your treatment and safety. Be sure to make and go to all appointments, and call your doctor if you are having problems. It's also a good idea to know your test results and keep a list of the medicines you take. How can you care for yourself at home? Following the DASH diet  · Eat 4 to 5 servings of fruit each day. A serving is 1 medium-sized piece of fruit, ½ cup chopped or canned fruit, 1/4 cup dried fruit, or 4 ounces (½ cup) of fruit juice. Choose fruit more often than fruit juice.   · Eat 4 to 5 servings of vegetables each day. A serving is 1 cup of lettuce or raw leafy vegetables, ½ cup of chopped or cooked vegetables, or 4 ounces (½ cup) of vegetable juice. Choose vegetables more often than vegetable juice. · Get 2 to 3 servings of low-fat and fat-free dairy each day. A serving is 8 ounces of milk, 1 cup of yogurt, or 1 ½ ounces of cheese. · Eat 6 to 8 servings of grains each day. A serving is 1 slice of bread, 1 ounce of dry cereal, or ½ cup of cooked rice, pasta, or cooked cereal. Try to choose whole-grain products as much as possible. · Limit lean meat, poultry, and fish to 2 servings each day. A serving is 3 ounces, about the size of a deck of cards. · Eat 4 to 5 servings of nuts, seeds, and legumes (cooked dried beans, lentils, and split peas) each week. A serving is 1/3 cup of nuts, 2 tablespoons of seeds, or ½ cup of cooked beans or peas. · Limit fats and oils to 2 to 3 servings each day. A serving is 1 teaspoon of vegetable oil or 2 tablespoons of salad dressing. · Limit sweets and added sugars to 5 servings or less a week. A serving is 1 tablespoon jelly or jam, ½ cup sorbet, or 1 cup of lemonade. · Eat less than 2,300 milligrams (mg) of sodium a day. If you limit your sodium to 1,500 mg a day, you can lower your blood pressure even more. Tips for success  · Start small. Do not try to make dramatic changes to your diet all at once. You might feel that you are missing out on your favorite foods and then be more likely to not follow the plan. Make small changes, and stick with them. Once those changes become habit, add a few more changes. · Try some of the following:  ¨ Make it a goal to eat a fruit or vegetable at every meal and at snacks. This will make it easy to get the recommended amount of fruits and vegetables each day. ¨ Try yogurt topped with fruit and nuts for a snack or healthy dessert. ¨ Add lettuce, tomato, cucumber, and onion to sandwiches.   ¨ Combine a ready-made pizza crust with low-fat mozzarella cheese and lots of vegetable toppings. Try using tomatoes, squash, spinach, broccoli, carrots, cauliflower, and onions. ¨ Have a variety of cut-up vegetables with a low-fat dip as an appetizer instead of chips and dip. ¨ Sprinkle sunflower seeds or chopped almonds over salads. Or try adding chopped walnuts or almonds to cooked vegetables. ¨ Try some vegetarian meals using beans and peas. Add garbanzo or kidney beans to salads. Make burritos and tacos with mashed orozco beans or black beans. Where can you learn more? Go to http://kerry-chucho.info/. Enter O359 in the search box to learn more about \"DASH Diet: Care Instructions. \"  Current as of: April 3, 2017  Content Version: 11.3  © 9041-5211 [employer]. Care instructions adapted under license by [employer] (which disclaims liability or warranty for this information). If you have questions about a medical condition or this instruction, always ask your healthcare professional. [place] [address] any warranty or liability for your use of this information.

## 2017-10-12 NOTE — PROGRESS NOTES
HPI  Sudha rDake is a 40 y.o. female being seen today for did not take her HCTZ today. Has been taking it steadily. Feels she could exercise and eat more healthfully. Chief Complaint   Patient presents with    Medication Refill     HTN medication refill   .  she states that she used to be on lisinopril and amlodipine in the past.      Past Medical History:   Diagnosis Date    Amenorrhea 1/23/2012    Endometrial hyperplasia without atypia, simple 2/17/2012    Genital warts 6/13/2017    Hypertension 12/2011    Obesity 3/5/2014         ROS  Patient states that she is feeling well. Denies complaints of chest pain, shortness of breath, swelling of legs, dizziness or weakness. she denies nausea, vomiting or diarrhea. Current Outpatient Prescriptions   Medication Sig    lisinopril-hydroCHLOROthiazide (PRINZIDE, ZESTORETIC) 10-12.5 mg per tablet Take 1 Tab by mouth daily.  norethindrone (MICRONOR) 0.35 mg tab Take 1 Tab by mouth daily. No current facility-administered medications for this visit. PE  Visit Vitals    BP (!) 158/99 (BP 1 Location: Left arm, BP Patient Position: Sitting)    Pulse (!) 58    Temp 98 °F (36.7 °C) (Oral)    Resp 22    Ht 5' 5\" (1.651 m)    Wt 234 lb (106.1 kg)    LMP 09/25/2017 (Approximate)    SpO2 99%    BMI 38.94 kg/m2        Alert and oriented with normal mood and affect. she is well developed and well nourished . Lungs are clear without wheezing. Heart rate is regular without murmurs or gallops. There is no lower extremity edema. Assessment and Plan:        ICD-10-CM ICD-9-CM    1. Pre-diabetes R73.03 790.29    2. Essential hypertension I10 401.9 lisinopril-hydroCHLOROthiazide (PRINZIDE, ZESTORETIC) 10-12.5 mg per tablet   3.  Mixed hyperlipidemia B04.2 480.7 METABOLIC PANEL, COMPREHENSIVE      LIPID PANEL     Stop hctz, start lisinopril/hctz 10/12.5mg  DASH diet  [facility] program based on A1C results  F/u 6 months    Sheila Santos, NP

## 2018-05-10 ENCOUNTER — OFFICE VISIT (OUTPATIENT)
Dept: FAMILY MEDICINE CLINIC | Facility: CLINIC | Age: 43
End: 2018-05-10

## 2018-05-10 VITALS
HEIGHT: 65 IN | HEART RATE: 75 BPM | TEMPERATURE: 49.8 F | SYSTOLIC BLOOD PRESSURE: 168 MMHG | WEIGHT: 237 LBS | BODY MASS INDEX: 39.49 KG/M2 | OXYGEN SATURATION: 96 % | DIASTOLIC BLOOD PRESSURE: 107 MMHG | RESPIRATION RATE: 16 BRPM

## 2018-05-10 DIAGNOSIS — N85.01 ENDOMETRIAL HYPERPLASIA WITHOUT ATYPIA, SIMPLE: ICD-10-CM

## 2018-05-10 DIAGNOSIS — Z00.00 ROUTINE GENERAL MEDICAL EXAMINATION AT HEALTH CARE FACILITY: ICD-10-CM

## 2018-05-10 DIAGNOSIS — E66.01 SEVERE OBESITY (BMI 35.0-39.9) WITH COMORBIDITY (HCC): ICD-10-CM

## 2018-05-10 DIAGNOSIS — N85.2 UTERINE ENLARGEMENT: ICD-10-CM

## 2018-05-10 DIAGNOSIS — Z23 ENCOUNTER FOR IMMUNIZATION: ICD-10-CM

## 2018-05-10 DIAGNOSIS — Z12.31 ENCOUNTER FOR SCREENING MAMMOGRAM FOR MALIGNANT NEOPLASM OF BREAST: ICD-10-CM

## 2018-05-10 DIAGNOSIS — E78.2 MIXED HYPERLIPIDEMIA: ICD-10-CM

## 2018-05-10 DIAGNOSIS — A63.0 GENITAL WARTS: ICD-10-CM

## 2018-05-10 DIAGNOSIS — I10 ESSENTIAL HYPERTENSION: Primary | ICD-10-CM

## 2018-05-10 DIAGNOSIS — Z30.09 FAMILY PLANNING: ICD-10-CM

## 2018-05-10 PROBLEM — R73.03 PRE-DIABETES: Status: ACTIVE | Noted: 2018-05-10

## 2018-05-10 PROBLEM — H20.00 ACUTE ANTERIOR UVEITIS OF BOTH EYES: Status: ACTIVE | Noted: 2018-02-15

## 2018-05-10 PROBLEM — D31.01 NEVUS OF RIGHT CONJUNCTIVA: Status: ACTIVE | Noted: 2018-03-16

## 2018-05-10 RX ORDER — ACETAMINOPHEN AND CODEINE PHOSPHATE 120; 12 MG/5ML; MG/5ML
1 SOLUTION ORAL DAILY
Qty: 28 TAB | Refills: 11 | Status: SHIPPED | OUTPATIENT
Start: 2018-05-10 | End: 2019-12-09

## 2018-05-10 RX ORDER — LISINOPRIL AND HYDROCHLOROTHIAZIDE 10; 12.5 MG/1; MG/1
2 TABLET ORAL DAILY
Qty: 60 TAB | Refills: 3 | Status: SHIPPED | OUTPATIENT
Start: 2018-05-10 | End: 2018-08-10 | Stop reason: DRUGHIGH

## 2018-05-10 NOTE — MR AVS SNAPSHOT
[address] [place] [address] [phone] 
[phone] Patient: Jacob Washburn MRN: [id number] JLN:[id number] Visit Information Date & Time Provider Department Dept. Phone Encounter #  
 5/10/2018  8:30 AM Eleno Kennedy NP [facility] [phone] [id number] Follow-up Instructions Return in about 3 months (around 8/10/2018). Upcoming Health Maintenance Date Due DTaP/Tdap/Td series (1 - Tdap) 6/7/1997 Influenza Age 5 to Adult 8/1/2018 PAP AKA CERVICAL CYTOLOGY 5/15/2020 Allergies as of 5/10/2018  Review Complete On: 5/10/2018 By: Eleno Kennedy NP No Known Allergies Current Immunizations  Never Reviewed No immunizations on file. Not reviewed this visit You Were Diagnosed With   
  
 Codes Comments Essential hypertension    -  Primary ICD-10-CM: I10 
ICD-9-CM: 401.9 Mixed hyperlipidemia     ICD-10-CM: E78.2 ICD-9-CM: 272.2 Severe obesity (BMI 35.0-39. 9) with comorbidity ([address]     ICD-10-CM: E66.01 
ICD-9-CM: 278.01 Uterine enlargement     ICD-10-CM: N85.2 ICD-9-CM: 621.2 Endometrial hyperplasia without atypia, simple     ICD-10-CM: N85.01 
ICD-9-CM: 621.31 Genital warts     ICD-10-CM: A63.0 ICD-9-CM: 078.11 Routine general medical examination at health care facility     ICD-10-CM: Z00.00 ICD-9-CM: V70.0 Family planning     ICD-10-CM: Z30.09 
ICD-9-CM: V25.09 Encounter for immunization     ICD-10-CM: T79 ICD-9-CM: V03.89 Encounter for screening mammogram for malignant neoplasm of breast     ICD-10-CM: Z12.31 
ICD-9-CM: V76.12 Vitals BP Pulse Temp Resp Height(growth percentile) Weight(growth percentile) (!) 168/107 (BP 1 Location: Right arm, BP Patient Position: Sitting) 75 (!) 49.8 °F (9.9 °C) (Oral) 16 5' 5\" (1.651 m) 237 lb (107.5 kg) LMP SpO2 BMI OB Status Smoking Status 2018 96% 39.44 kg/m2 Having regular periods Never Smoker BMI and BSA Data Body Mass Index Body Surface Area  
 39.44 kg/m 2 2.22 m 2 Preferred Pharmacy Pharmacy Name Phone [facility] [address], [address] [phone] Your Updated Medication List  
  
   
This list is accurate as of 5/10/18  9:42 AM.  Always use your most recent med list.  
  
  
  
  
 diph,Pertuss(Acell),Tet Vac-PF 2 Lf-(2.5-5-3-5 mcg)-5Lf/0.5 mL susp Commonly known as:  ADACEL  
0.5 mL by IntraMUSCular route once for 1 dose. lisinopril-hydroCHLOROthiazide 10-12.5 mg per tablet Commonly known as:  Sterling Rom Take 2 Tabs by mouth daily. norethindrone 0.35 mg Tab Commonly known as:  Devang & Devang Take 1 Tab by mouth daily. Prescriptions Printed Refills diph,Pertuss,Acell,,Tet Vac-PF (ADACEL) 2 Lf-(2.5-5-3-5 mcg)-5Lf/0.5 mL susp 0 Si.5 mL by IntraMUSCular route once for 1 dose. Class: Print Route: IntraMUSCular Prescriptions Sent to Pharmacy Refills  
 lisinopril-hydroCHLOROthiazide (PRINZIDE, ZESTORETIC) 10-12.5 mg per tablet 3 Sig: Take 2 Tabs by mouth daily. Class: Normal  
 Pharmacy: "[facility] [place] Ph #: [phone] Route: Oral  
 norethindrone (MICRONOR) 0.35 mg tab 11 Sig: Take 1 Tab by mouth daily. Class: Normal  
 Pharmacy: [id number] [facility] Ph #: [phone] Route: Oral  
  
We Performed the Following CBC WITH AUTOMATED DIFF [60647 CPT(R)] HEMOGLOBIN A1C W/O EAG [51460 CPT(R)] LIPID PANEL [13716 CPT(R)] METABOLIC PANEL, COMPREHENSIVE [48632 CPT(R)] DE PATIENT SCREENED FOR DEPRESSION [1220F CPT(R)] T4, FREE [id number] CPT(R)] TSH 3RD GENERATION [86629 CPT(R)] URINALYSIS W/ RFLX MICROSCOPIC [50458 CPT(R)] VITAMIN D, 25 HYDROXY [id number] CPT(R)] Follow-up Instructions Return in about 3 months (around 8/10/2018). To-Do List   
 05/10/2018 Lab:  CBC WITH AUTOMATED DIFF   
  
 05/10/2018 Lab:  HEMOGLOBIN A1C W/O EAG   
  
 05/10/2018 Lab:  LIPID PANEL   
  
 05/10/2018 Imaging:  [facility] BI SCREENING INCL CAD   
  
 05/10/2018 Lab:  METABOLIC PANEL, COMPREHENSIVE   
  
 05/10/2018 Lab:  T4, FREE   
  
 05/10/2018 Lab:  TSH 3RD GENERATION   
  
 05/10/2018 Lab:  URINALYSIS W/ RFLX MICROSCOPIC   
  
 05/10/2018 Lab:  VITAMIN D, 25 HYDROXY Patient Instructions High Blood Pressure: Care Instructions Your Care Instructions If your blood pressure is usually above 140/90, you have high blood pressure, or hypertension. That means the top number is 140 or higher or the bottom number is 90 or higher, or both. Despite what a lot of people think, high blood pressure usually doesn't cause headaches or make you feel dizzy or lightheaded. It usually has no symptoms. But it does increase your risk for heart attack, stroke, and kidney or eye damage. The higher your blood pressure, the more your risk increases. Your doctor will give you a goal for your blood pressure. Your goal will be based on your health and your age. An example of a goal is to keep your blood pressure below 140/90. Lifestyle changes, such as eating healthy and being active, are always important to help lower blood pressure. You might also take medicine to reach your blood pressure goal. 
Follow-up care is a key part of your treatment and safety. Be sure to make and go to all appointments, and call your doctor if you are having problems. It's also a good idea to know your test results and keep a list of the medicines you take. How can you care for yourself at home? Medical treatment · If you stop taking your medicine, your blood pressure will go back up. You may take one or more types of medicine to lower your blood pressure. Be safe with medicines. Take your medicine exactly as prescribed. Call your doctor if you think you are having a problem with your medicine. · Talk to your doctor before you start taking aspirin every day. Aspirin can help certain people lower their risk of a heart attack or stroke. But taking aspirin isn't right for everyone, because it can cause serious bleeding. · See your doctor regularly. You may need to see the doctor more often at first or until your blood pressure comes down. · If you are taking blood pressure medicine, talk to your doctor before you take decongestants or anti-inflammatory medicine, such as ibuprofen. Some of these medicines can raise blood pressure. · Learn how to check your blood pressure at home. Lifestyle changes · Stay at a healthy weight. This is especially important if you put on weight around the waist. Losing even 10 pounds can help you lower your blood pressure. · If your doctor recommends it, get more exercise. Walking is a good choice. Bit by bit, increase the amount you walk every day. Try for at least 30 minutes on most days of the week. You also may want to swim, bike, or do other activities. · Avoid or limit alcohol. Talk to your doctor about whether you can drink any alcohol. · Try to limit how much sodium you eat to less than 2,300 milligrams (mg) a day. Your doctor may ask you to try to eat less than 1,500 mg a day. · Eat plenty of fruits (such as bananas and oranges), vegetables, legumes, whole grains, and low-fat dairy products. · Lower the amount of saturated fat in your diet. Saturated fat is found in animal products such as milk, cheese, and meat. Limiting these foods may help you lose weight and also lower your risk for heart disease. · Do not smoke. Smoking increases your risk for heart attack and stroke. If you need help quitting, talk to your doctor about stop-smoking programs and medicines. These can increase your chances of quitting for good. When should you call for help? Call 911 anytime you think you may need emergency care. This may mean having symptoms that suggest that your blood pressure is causing a serious heart or blood vessel problem. Your blood pressure may be over 180/110. ? For example, call 911 if: 
? · You have symptoms of a heart attack. These may include: ¨ Chest pain or pressure, or a strange feeling in the chest. 
¨ Sweating. ¨ Shortness of breath. ¨ Nausea or vomiting. ¨ Pain, pressure, or a strange feeling in the back, neck, jaw, or upper belly or in one or both shoulders or arms. ¨ Lightheadedness or sudden weakness. ¨ A fast or irregular heartbeat. ? · You have symptoms of a stroke. These may include: 
¨ Sudden numbness, tingling, weakness, or loss of movement in your face, arm, or leg, especially on only one side of your body. ¨ Sudden vision changes. ¨ Sudden trouble speaking. ¨ Sudden confusion or trouble understanding simple statements. ¨ Sudden problems with walking or balance. ¨ A sudden, severe headache that is different from past headaches. ? · You have severe back or belly pain. ?Do not wait until your blood pressure comes down on its own. Get help right away. ?Call your doctor now or seek immediate care if: 
? · Your blood pressure is much higher than normal (such as 180/110 or higher), but you don't have symptoms. ? · You think high blood pressure is causing symptoms, such as: ¨ Severe headache. ¨ Blurry vision. ? Watch closely for changes in your health, and be sure to contact your doctor if: 
? · Your blood pressure measures 140/90 or higher at least 2 times. That means the top number is 140 or higher or the bottom number is 90 or higher, or both. ? · You think you may be having side effects from your blood pressure medicine. ? · Your blood pressure is usually normal, but it goes above normal at least 2 times. Where can you learn more? Go to http://kerry-chucho.info/. Enter U293 in the search box to learn more about \"High Blood Pressure: Care Instructions. \" Current as of: September 21, 2016 Content Version: 11.4 © 6446-6743 [employer]. Care instructions adapted under license by [employer] (which disclaims liability or warranty for this information). If you have questions about a medical condition or this instruction, always ask your healthcare professional. [address] any warranty or liability for your use of this information. DASH Diet: Care Instructions Your Care Instructions The DASH diet is an eating plan that can help lower your blood pressure. DASH stands for Dietary Approaches to Stop Hypertension. Hypertension is high blood pressure. The DASH diet focuses on eating foods that are high in calcium, potassium, and magnesium. These nutrients can lower blood pressure. The foods that are highest in these nutrients are fruits, vegetables, low-fat dairy products, nuts, seeds, and legumes. But taking calcium, potassium, and magnesium supplements instead of eating foods that are high in those nutrients does not have the same effect. The DASH diet also includes whole grains, fish, and poultry. The DASH diet is one of several lifestyle changes your doctor may recommend to lower your high blood pressure. Your doctor may also want you to decrease the amount of sodium in your diet. Lowering sodium while following the DASH diet can lower blood pressure even further than just the DASH diet alone. Follow-up care is a key part of your treatment and safety. Be sure to make and go to all appointments, and call your doctor if you are having problems. It's also a good idea to know your test results and keep a list of the medicines you take. How can you care for yourself at home? Following the DASH diet · Eat 4 to 5 servings of fruit each day. A serving is 1 medium-sized piece of fruit, ½ cup chopped or canned fruit, 1/4 cup dried fruit, or 4 ounces (½ cup) of fruit juice. Choose fruit more often than fruit juice. · Eat 4 to 5 servings of vegetables each day. A serving is 1 cup of lettuce or raw leafy vegetables, ½ cup of chopped or cooked vegetables, or 4 ounces (½ cup) of vegetable juice. Choose vegetables more often than vegetable juice. · Get 2 to 3 servings of low-fat and fat-free dairy each day. A serving is 8 ounces of milk, 1 cup of yogurt, or 1 ½ ounces of cheese. · Eat 6 to 8 servings of grains each day. A serving is 1 slice of bread, 1 ounce of dry cereal, or ½ cup of cooked rice, pasta, or cooked cereal. Try to choose whole-grain products as much as possible. · Limit lean meat, poultry, and fish to 2 servings each day. A serving is 3 ounces, about the size of a deck of cards. · Eat 4 to 5 servings of nuts, seeds, and legumes (cooked dried beans, lentils, and split peas) each week. A serving is 1/3 cup of nuts, 2 tablespoons of seeds, or ½ cup of cooked beans or peas. · Limit fats and oils to 2 to 3 servings each day. A serving is 1 teaspoon of vegetable oil or 2 tablespoons of salad dressing. · Limit sweets and added sugars to 5 servings or less a week. A serving is 1 tablespoon jelly or jam, ½ cup sorbet, or 1 cup of lemonade. · Eat less than 2,300 milligrams (mg) of sodium a day. If you limit your sodium to 1,500 mg a day, you can lower your blood pressure even more. Tips for success · Start small. Do not try to make dramatic changes to your diet all at once. You might feel that you are missing out on your favorite foods and then be more likely to not follow the plan. Make small changes, and stick with them. Once those changes become habit, add a few more changes. · Try some of the following: ¨ Make it a goal to eat a fruit or vegetable at every meal and at snacks. This will make it easy to get the recommended amount of fruits and vegetables each day. ¨ Try yogurt topped with fruit and nuts for a snack or healthy dessert. ¨ Add lettuce, tomato, cucumber, and onion to sandwiches. ¨ Combine a ready-made pizza crust with low-fat mozzarella cheese and lots of vegetable toppings. Try using tomatoes, squash, spinach, broccoli, carrots, cauliflower, and onions. ¨ Have a variety of cut-up vegetables with a low-fat dip as an appetizer instead of chips and dip. ¨ Sprinkle sunflower seeds or chopped almonds over salads. Or try adding chopped walnuts or almonds to cooked vegetables. ¨ Try some vegetarian meals using beans and peas. Add garbanzo or kidney beans to salads. Make burritos and tacos with mashed orozco beans or black beans. Where can you learn more? Go to http://kerry-chucho.info/. Enter Q211 in the search box to learn more about \"DASH Diet: Care Instructions. \" Current as of: September 21, 2016 Content Version: 11.4 © 8252-9121 [employer]. Care instructions adapted under license by [employer] (which disclaims liability or warranty for this information). If you have questions about a medical condition or this instruction, always ask your healthcare professional. [place] [address] any warranty or liability for your use of this information. Low Sodium Diet (2,000 Milligram): Care Instructions Your Care Instructions Too much sodium causes your body to hold on to extra water. This can raise your blood pressure and force your heart and kidneys to work harder. In very serious cases, this could cause you to be put in the hospital. It might even be life-threatening. By limiting sodium, you will feel better and lower your risk of serious problems. The most common source of sodium is salt. People get most of the salt in their diet from canned, prepared, and packaged foods.  Fast food and restaurant meals also are very high in sodium. Your doctor will probably limit your sodium to less than 2,000 milligrams (mg) a day. This limit counts all the sodium in prepared and packaged foods and any salt you add to your food. Follow-up care is a key part of your treatment and safety. Be sure to make and go to all appointments, and call your doctor if you are having problems. It's also a good idea to know your test results and keep a list of the medicines you take. How can you care for yourself at home? Read food labels · Read labels on cans and food packages. The labels tell you how much sodium is in each serving. Make sure that you look at the serving size. If you eat more than the serving size, you have eaten more sodium. · Food labels also tell you the Percent Daily Value for sodium. Choose products with low Percent Daily Values for sodium. · Be aware that sodium can come in forms other than salt, including monosodium glutamate (MSG), sodium citrate, and sodium bicarbonate (baking soda). MSG is often added to Asian food. When you eat out, you can sometimes ask for food without MSG or added salt. Buy low-sodium foods · Buy foods that are labeled \"unsalted\" (no salt added), \"sodium-free\" (less than 5 mg of sodium per serving), or \"low-sodium\" (less than 140 mg of sodium per serving). Foods labeled \"reduced-sodium\" and \"light sodium\" may still have too much sodium. Be sure to read the label to see how much sodium you are getting. · Buy fresh vegetables, or frozen vegetables without added sauces. Buy low-sodium versions of canned vegetables, soups, and other canned goods. Prepare low-sodium meals · Cut back on the amount of salt you use in cooking. This will help you adjust to the taste. Do not add salt after cooking. One teaspoon of salt has about 2,300 mg of sodium. · Take the salt shaker off the table.  
· Flavor your food with garlic, lemon juice, onion, vinegar, herbs, and spices. Do not use soy sauce, lite soy sauce, steak sauce, onion salt, garlic salt, celery salt, mustard, or ketchup on your food. · Use low-sodium salad dressings, sauces, and ketchup. Or make your own salad dressings and sauces without adding salt. · Use less salt (or none) when recipes call for it. You can often use half the salt a recipe calls for without losing flavor. Other foods such as rice, pasta, and grains do not need added salt. · Rinse canned vegetables, and cook them in fresh water. This removes some-but not all-of the salt. · Avoid water that is naturally high in sodium or that has been treated with water softeners, which add sodium. Call your local water company to find out the sodium content of your water supply. If you buy bottled water, read the label and choose a sodium-free brand. Avoid high-sodium foods · Avoid eating: ¨ Smoked, cured, salted, and canned meat, fish, and poultry. ¨ Ham, dominguez, hot dogs, and luncheon meats. ¨ Regular, hard, and processed cheese and regular peanut butter. ¨ Crackers with salted tops, and other salted snack foods such as pretzels, chips, and salted popcorn. ¨ Frozen prepared meals, unless labeled low-sodium. ¨ Canned and dried soups, broths, and bouillon, unless labeled sodium-free or low-sodium. ¨ Canned vegetables, unless labeled sodium-free or low-sodium. ¨ Western Stephanie fries, pizza, tacos, and other fast foods. ¨ Pickles, olives, ketchup, and other condiments, especially soy sauce, unless labeled sodium-free or low-sodium. Where can you learn more? Go to http://kerry-chucho.info/. Enter H862 in the search box to learn more about \"Low Sodium Diet (2,000 Milligram): Care Instructions. \" Current as of: May 12, 2017 Content Version: 11.4 © 0640-0828 [employer].  Care instructions adapted under license by [employer] (which disclaims liability or warranty for this information). If you have questions about a medical condition or this instruction, always ask your healthcare professional. [address] any warranty or liability for your use of this information. Stroke: Care Instructions Your Care Instructions You have had a stroke. This means that the blood flow to a part of your brain was blocked for some time, which damages the nerve cells in that part of the brain. The part of your body controlled by that part of your brain may not function properly now. The brain is an amazing organ that can heal itself to some degree. The stroke you had damaged part of your brain. But other parts of your brain may take over in some way for the damaged areas. You have already started this process. Your doctor will talk with you about what you can do to prevent another stroke. High blood pressure, high cholesterol, and diabetes are all risk factors for stroke. If you have any of these conditions, work with your doctor to make sure they are under control. Other risk factors for stroke include being overweight, smoking, and not getting regular exercise. Going home may be hard for you and your family. The more you can try to do for yourself, the better. Remember to take each day one at a time. Follow-up care is a key part of your treatment and safety. Be sure to make and go to all appointments, and call your doctor if you are having problems. It's also a good idea to know your test results and keep a list of the medicines you take. How can you care for yourself at home? ? · Enter a stroke rehabilitation (rehab) program, if your doctor recommends it. Physical, speech, and occupational therapies can help you manage bathing, dressing, eating, and other basics of daily living. ? · Do not drive until your doctor says it is okay. ? · It is normal to feel sad or depressed after a stroke. If these feelings last, talk to your doctor. ? · If you are having problems with urine leakage, go to the bathroom at regular times, including when you first wake up and at bedtime. Also, limit fluids after dinner. ? · If you are constipated, drink plenty of fluids, enough so that your urine is light yellow or clear like water. If you have kidney, heart, or liver disease and have to limit fluids, talk with your doctor before you increase the amount of fluids you drink. Set up a regular time for using the toilet. If you continue to have constipation, your doctor may suggest using a bulking agent, such as Metamucil, or a stool softener, laxative, or enema. Medicines ? · Take your medicines exactly as prescribed. Call your doctor if you think you are having a problem with your medicine. You may be taking several medicines. ACE (angiotensin-converting enzyme) inhibitors, angiotensin II receptor blockers (ARBs), beta-blockers, diuretics (water pills), and calcium channel blockers control your blood pressure. Statins help lower cholesterol. Your doctor may also prescribe medicines for depression, pain, sleep problems, anxiety, or agitation. ? · If your doctor has given you a blood thinner to prevent another stroke, be sure you get instructions about how to take your medicine safely. Blood thinners can cause serious bleeding problems. ? · Do not take any over-the-counter medicines or herbal products without talking to your doctor first.  
? · If you take birth control pills or hormone therapy, talk to your doctor about whether they are right for you. ? For family members and caregivers ? · Make the home safe. Set up a room so that your loved one does not have to climb stairs. Be sure the bathroom is on the same floor. Move throw rugs and furniture that could cause falls. Make sure that the lighting is good. Put grab bars and seats in tubs and showers.   
? · Find out what your loved one can do and what he or she needs help with. Try not to do things for your loved one that your loved one can do on his or her own. Help him or her learn and practice new skills. ? · Visit and talk with your loved one often. Try doing activities together that you both enjoy, such as playing cards or board games. Keep in touch with your loved one's friends as much as you can. Encourage them to visit. ? · Take care of yourself. Do not try to do everything yourself. Ask other family members to help. Eat well, get enough rest, and take time to do things that you enjoy. Keep up with your own doctor visits, and make sure to take your medicines regularly. Get out of the house as much as you can. Join a local support group. Find out if you qualify for home health care visits to help with rehab or for adult day care. When should you call for help? Call 911 anytime you think you may need emergency care. For example, call if: 
? · You have signs of another stroke. These may include: 
¨ Sudden numbness, tingling, weakness, or loss of movement in your face, arm, or leg, especially on only one side of your body. ¨ Sudden vision changes. ¨ Sudden trouble speaking. ¨ Sudden confusion or trouble understanding simple statements. ¨ Sudden problems with walking or balance. ¨ A sudden, severe headache that is different from past headaches. Call 911 even if these symptoms go away in a few minutes. ?Call your doctor now or seek immediate medical care if: 
? · You have new symptoms that may be related to your stroke, such as falls or trouble swallowing. ? Watch closely for changes in your health, and be sure to contact your doctor if you have any problems. Where can you learn more? Go to http://kerry-chucho.info/. Enter T945 in the search box to learn more about \"Stroke: Care Instructions. \" Current as of: March 20, 2017 Content Version: 11.4 © 5817-1273 Healthwise, Incorporated.  Care instructions adapted under license by [address] (which disclaims liability or warranty for this information). If you have questions about a medical condition or this instruction, always ask your healthcare professional. [address] any warranty or liability for your use of this information. Vaginal Yeast Infection: Care Instructions Your Care Instructions A vaginal yeast infection is caused by too many yeast cells in the vagina. This is common in women of all ages. Itching, vaginal discharge and irritation, and other symptoms can bother you. But yeast infections don't often cause other health problems. Some medicines can increase your risk of getting a yeast infection. These include antibiotics, birth control pills, hormones, and steroids. You may also be more likely to get a yeast infection if you are pregnant, have diabetes, douche, or wear tight clothes. With treatment, most yeast infections get better in 2 to 3 days. Follow-up care is a key part of your treatment and safety. Be sure to make and go to all appointments, and call your doctor if you are having problems. It's also a good idea to know your test results and keep a list of the medicines you take. How can you care for yourself at home? · Take your medicines exactly as prescribed. Call your doctor if you think you are having a problem with your medicine. · Ask your doctor about over-the-counter (OTC) medicines for yeast infections. They may cost less than prescription medicines. If you use an OTC treatment, read and follow all instructions on the label. · Do not use tampons while using a vaginal cream or suppository. The tampons can absorb the medicine. Use pads instead. · Wear loose cotton clothing. Do not wear nylon or other fabric that holds body heat and moisture close to the skin. · Try sleeping without underwear. · Do not scratch. Relieve itching with a cold pack or a cool bath. · Do not wash your vaginal area more than once a day. Use plain water or a mild, unscented soap. Air-dry the vaginal area. · Change out of wet swimsuits after swimming. · Do not have sex until you have finished your treatment. · Do not douche. When should you call for help? Call your doctor now or seek immediate medical care if: 
? · You have unexpected vaginal bleeding. ? · You have new or increased pain in your vagina or pelvis. ? Watch closely for changes in your health, and be sure to contact your doctor if: 
? · You have a fever. ? · You are not getting better after 2 days. ? · Your symptoms come back after you finish your medicines. Where can you learn more? Go to http://kerry-chucho.info/. Enter H449 in the search box to learn more about \"Vaginal Yeast Infection: Care Instructions. \" Current as of: October 13, 2016 Content Version: 11.4 © 3495-2388 [employer]. Care instructions adapted under license by [employer] (which disclaims liability or warranty for this information). If you have questions about a medical condition or this instruction, always ask your healthcare professional. [place] [address] any warranty or liability for your use of this information. Body Mass Index: Care Instructions Your Care Instructions Body mass index (BMI) can help you see if your weight is raising your risk for health problems. It uses a formula to compare how much you weigh with how tall you are. · A BMI lower than 18.5 is considered underweight. · A BMI between 18.5 and 24.9 is considered healthy. · A BMI between 25 and 29.9 is considered overweight. A BMI of 30 or higher is considered obese. If your BMI is in the normal range, it means that you have a lower risk for weight-related health problems.  If your BMI is in the overweight or obese range, you may be at increased risk for weight-related health problems, such as high blood pressure, heart disease, stroke, arthritis or joint pain, and diabetes. If your BMI is in the underweight range, you may be at increased risk for health problems such as fatigue, lower protection (immunity) against illness, muscle loss, bone loss, hair loss, and hormone problems. BMI is just one measure of your risk for weight-related health problems. You may be at higher risk for health problems if you are not active, you eat an unhealthy diet, or you drink too much alcohol or use tobacco products. Follow-up care is a key part of your treatment and safety. Be sure to make and go to all appointments, and call your doctor if you are having problems. It's also a good idea to know your test results and keep a list of the medicines you take. How can you care for yourself at home? · Practice healthy eating habits. This includes eating plenty of fruits, vegetables, whole grains, lean protein, and low-fat dairy. · If your doctor recommends it, get more exercise. Walking is a good choice. Bit by bit, increase the amount you walk every day. Try for at least 30 minutes on most days of the week. · Do not smoke. Smoking can increase your risk for health problems. If you need help quitting, talk to your doctor about stop-smoking programs and medicines. These can increase your chances of quitting for good. · Limit alcohol to 2 drinks a day for men and 1 drink a day for women. Too much alcohol can cause health problems. If you have a BMI higher than 25 · Your doctor may do other tests to check your risk for weight-related health problems. This may include measuring the distance around your waist. A waist measurement of more than 40 inches in men or 35 inches in women can increase the risk of weight-related health problems. · Talk with your doctor about steps you can take to stay healthy or improve your health.  You may need to make lifestyle changes to lose weight and stay healthy, such as changing your diet and getting regular exercise. If you have a BMI lower than 18.5 · Your doctor may do other tests to check your risk for health problems. · Talk with your doctor about steps you can take to stay healthy or improve your health. You may need to make lifestyle changes to gain or maintain weight and stay healthy, such as getting more healthy foods in your diet and doing exercises to build muscle. Where can you learn more? Go to http://kerry-chucho.info/. Enter S176 in the search box to learn more about \"Body Mass Index: Care Instructions. \" Current as of: October 13, 2016 Content Version: 11.4 © 9754-3858 [employer]. Care instructions adapted under license by [employer] (which disclaims liability or warranty for this information). If you have questions about a medical condition or this instruction, always ask your healthcare professional. [place] [address] any warranty or liability for your use of this information. Learning About Low-Carbohydrate Diets for Weight Loss What is a low-carbohydrate diet? Low-carb diets avoid foods that are high in carbohydrate. These high-carb foods include pasta, bread, rice, cereal, fruits, and starchy vegetables. Instead, these diets usually have you eat foods that are high in fat and protein. Many people lose weight quickly on a low-carb diet. But the early weight loss is water. People on this diet often gain the weight back after they start eating carbs again. Not all diet plans are safe or work well. A lot of the evidence shows that low-carb diets aren't healthy. That's because these diets often don't include healthy foods like fruits and vegetables. Losing weight safely means balancing protein, fat, and carbs with every meal and snack. And low-carb diets don't always provide the vitamins, minerals, and fiber you need. If you have a serious medical condition, talk to your doctor before you try any diet. These conditions include kidney disease, heart disease, type 2 diabetes, high cholesterol, and high blood pressure. If you are pregnant, it may not be safe for your baby if you are on a low-carb diet. How can you lose weight safely? You might have heard that a diet plan helped another person lose weight. But that doesn't mean that it will work for you. It is very hard to stay on a diet that includes lots of big changes in your eating habits. If you want to get to a healthy weight and stay there, making healthy lifestyle changes will often work better than dieting. These steps can help. · Make a plan for change. Work with your doctor to create a plan that is right for you. · See a dietitian. He or she can show you how to make healthy changes in your eating habits. · Manage stress. If you have a lot of stress in your life, it can be hard to focus on making healthy changes to your daily habits. · Track your food and activity. You are likely to do better at losing weight if you keep track of what you eat and what you do. Follow-up care is a key part of your treatment and safety. Be sure to make and go to all appointments, and call your doctor if you are having problems. It's also a good idea to know your test results and keep a list of the medicines you take. Where can you learn more? Go to http://kerry-chucho.info/. Enter A121 in the search box to learn more about \"Learning About Low-Carbohydrate Diets for Weight Loss. \" Current as of: May 12, 2017 Content Version: 11.4 © 1041-2453 Healthwise, Incorporated. Care instructions adapted under license by [employer] (which disclaims liability or warranty for this information).  If you have questions about a medical condition or this instruction, always ask your healthcare professional. [employer] disclaims any warranty or liability for your use of this information. Well Visit, Ages 25 to 48: Care Instructions Your Care Instructions Physical exams can help you stay healthy. Your doctor has checked your overall health and may have suggested ways to take good care of yourself. He or she also may have recommended tests. At home, you can help prevent illness with healthy eating, regular exercise, and other steps. Follow-up care is a key part of your treatment and safety. Be sure to make and go to all appointments, and call your doctor if you are having problems. It's also a good idea to know your test results and keep a list of the medicines you take. How can you care for yourself at home? · Reach and stay at a healthy weight. This will lower your risk for many problems, such as obesity, diabetes, heart disease, and high blood pressure. · Get at least 30 minutes of physical activity on most days of the week. Walking is a good choice. You also may want to do other activities, such as running, swimming, cycling, or playing tennis or team sports. Discuss any changes in your exercise program with your doctor. · Do not smoke or allow others to smoke around you. If you need help quitting, talk to your doctor about stop-smoking programs and medicines. These can increase your chances of quitting for good. · Talk to your doctor about whether you have any risk factors for sexually transmitted infections (STIs). Having one sex partner (who does not have STIs and does not have sex with anyone else) is a good way to avoid these infections. · Use birth control if you do not want to have children at this time. Talk with your doctor about the choices available and what might be best for you. · Protect your skin from too much sun.  When you're outdoors from 10 a.m. to 4 p.m., stay in the shade or cover up with clothing and a hat with a wide brim. Wear sunglasses that block UV rays. Even when it's cloudy, put broad-spectrum sunscreen (SPF 30 or higher) on any exposed skin. · See a dentist one or two times a year for checkups and to have your teeth cleaned. · Wear a seat belt in the car. · Drink alcohol in moderation, if at all. That means no more than 2 drinks a day for men and 1 drink a day for women. Follow your doctor's advice about when to have certain tests. These tests can spot problems early. For everyone · Cholesterol. Have the fat (cholesterol) in your blood tested after age 21. Your doctor will tell you how often to have this done based on your age, family history, or other things that can increase your risk for heart disease. · Blood pressure. Have your blood pressure checked during a routine doctor visit. Your doctor will tell you how often to check your blood pressure based on your age, your blood pressure results, and other factors. · Vision. Talk with your doctor about how often to have a glaucoma test. 
· Diabetes. Ask your doctor whether you should have tests for diabetes. · Colon cancer. Have a test for colon cancer at age 48. You may have one of several tests. If you are younger than 48, you may need a test earlier if you have any risk factors. Risk factors include whether you already had a precancerous polyp removed from your colon or whether your parent, brother, sister, or child has had colon cancer. For women · Breast exam and mammogram. Talk to your doctor about when you should have a clinical breast exam and a mammogram. Medical experts differ on whether and how often women under 50 should have these tests. Your doctor can help you decide what is right for you. · Pap test and pelvic exam. Begin Pap tests at age 24. A Pap test is the best way to find cervical cancer.  The test often is part of a pelvic exam. Ask how often to have this test. 
 · Tests for sexually transmitted infections (STIs). Ask whether you should have tests for STIs. You may be at risk if you have sex with more than one person, especially if your partners do not wear condoms. For men · Tests for sexually transmitted infections (STIs). Ask whether you should have tests for STIs. You may be at risk if you have sex with more than one person, especially if you do not wear a condom. · Testicular cancer exam. Ask your doctor whether you should check your testicles regularly. · Prostate exam. Talk to your doctor about whether you should have a blood test (called a PSA test) for prostate cancer. Experts differ on whether and when men should have this test. Some experts suggest it if you are older than 39 and are -American or have a father or brother who got prostate cancer when he was younger than 72. When should you call for help? Watch closely for changes in your health, and be sure to contact your doctor if you have any problems or symptoms that concern you. Where can you learn more? Go to http://kerry-chucho.info/. Enter P072 in the search box to learn more about \"Well Visit, Ages 25 to 48: Care Instructions. \" Current as of: May 12, 2017 Content Version: 11.4 © 4475-6329 Healthwise, [employer]. Care instructions adapted under license by [employer] (which disclaims liability or warranty for this information). If you have questions about a medical condition or this instruction, always ask your healthcare professional. [place] [address] any warranty or liability for your use of this information. Prediabetes: Care Instructions Your Care Instructions Prediabetes is a warning sign that you are at risk for getting type 2 diabetes. It means that your blood sugar is higher than it should be. The food you eat turns into sugar, which your body uses for energy.  Normally, an organ called the pancreas makes insulin, which allows the sugar in your blood to get into your body's cells. But when your body can't use insulin the right way, the sugar doesn't move into cells. It stays in your blood instead. This is called insulin resistance. The buildup of sugar in the blood causes prediabetes. The good news is that lifestyle changes may help you get your blood sugar back to normal and help you avoid or delay diabetes. Follow-up care is a key part of your treatment and safety. Be sure to make and go to all appointments, and call your doctor if you are having problems. It's also a good idea to know your test results and keep a list of the medicines you take. How can you care for yourself at home? · Watch your weight. A healthy weight helps your body use insulin properly. · Limit the amount of calories, sweets, and unhealthy fat you eat. Ask your doctor if you should see a dietitian. A registered dietitian can help you create meal plans that fit your lifestyle. · Get at least 30 minutes of exercise on most days of the week. Exercise helps control your blood sugar. It also helps you maintain a healthy weight. Walking is a good choice. You also may want to do other activities, such as running, swimming, cycling, or playing tennis or team sports. · Do not smoke. Smoking can make prediabetes worse. If you need help quitting, talk to your doctor about stop-smoking programs and medicines. These can increase your chances of quitting for good. · If your doctor prescribed medicines, take them exactly as prescribed. Call your doctor if you think you are having a problem with your medicine. You will get more details on the specific medicines your doctor prescribes. When should you call for help? Watch closely for changes in your health, and be sure to contact your doctor if: 
? · You have any symptoms of diabetes. These may include: ¨ Being thirsty more often. ¨ Urinating more. ¨ Being hungrier. ¨ Losing weight. ¨ Being very tired. ¨ Having blurry vision. ? · You have a wound that will not heal.  
? · You have an infection that will not go away. ? · You have problems with your blood pressure. ? · You want more information about diabetes and how you can keep from getting it. Where can you learn more? Go to http://kerry-chucho.info/. Enter I222 in the search box to learn more about \"Prediabetes: Care Instructions. \" Current as of: March 13, 2017 Content Version: 11.4 © 2422-1174 [employer]. Care instructions adapted under license by [employer] (which disclaims liability or warranty for this information). If you have questions about a medical condition or this instruction, always ask your healthcare professional. [address] any warranty or liability for your use of this information. Introducing [facility]! Lilly Patiño introduces Cur[employer] patient portal. Now you can access parts of your medical record, email your doctor's office, and request medication refills online. 1. In your internet browser, go to [email or web address] 2. Click on the First Time User? Click Here link in the Sign In box. You will see the New Member Sign Up page. 3. Enter your Curves Access Code exactly as it appears below. You will not need to use this code after youve completed the sign-up process. If you do not sign up before the expiration date, you must request a new code. · Curves Access Code: [email or web address] Expires: 8/8/2018  9:41 AM 
 
4. Enter the last four digits of your Social Security Number (xxxx) and Date of Birth (mm/dd/yyyy) as indicated and click Submit. You will be taken to the next sign-up page. 5. Create a Curves ID. This will be your Curves login ID and cannot be changed, so think of one that is secure and easy to remember. 6. Create a Talentag password. You can change your password at any time. 7. Enter your Password Reset Question and Answer. This can be used at a later time if you forget your password. 8. Enter your e-mail address. You will receive e-mail notification when new information is available in [address] 9. Click Sign Up. You can now view and download portions of your medical record. 10. Click the Download Summary menu link to download a portable copy of your medical information. If you have questions, please visit the Frequently Asked Questions section of the [employer] website. Remember, [employer]ag is NOT to be used for urgent needs. For medical emergencies, dial 911. Now available from your iPhone and Android! Please provide this summary of care documentation to your next provider. Your primary care clinician is listed as NONE. If you have any questions after today's visit, please call [phone].

## 2018-05-10 NOTE — PROGRESS NOTES
Today's Date:  5/10/2018   Patient's Name: Gloria Flower   Patient's :  1975     History:     Chief Complaint   Patient presents with   [address] [place] [address] Patriciokallie Samson is a 40 y.o. female presenting for initial visit to establish care. Last PCP was Dr. Conchis Fontenot , last saw 4 years ago. Was  being seen by Risa Joya saw PCP last seen by care ASHLEE Henley last year. She recently obtained Health insurance so cannot be seen by [address] [place] [address]  Pt sees speciallists. GYN last visit was last year  Will obtain records from previous provider to review. Hypertension   This is a chronic problem. She takes  Lisinopril- HCTZ 10-12.5 daily. She reports being non-compliant with this medication for over one month because she ran out. BP is elevated today   Hyperlipidemia  This is a chronic problem. Current status is unknown. Obesity  Chronic condition  she does not engages in a healthy lifestyle-   She is not attempting to follow a low fat /low sodium diet  She declined weight management referral      Past Medical History:   Diagnosis Date    Amenorrhea 2012    Endometrial hyperplasia without atypia, simple 2012    Genital warts 2017    Hypertension 2011    Obesity 3/5/2014     Past Surgical History:   Procedure Laterality Date    HX BREAST LUMPECTOMY      HX  SECTION  9202,8208    HX GYN        reports that she has never smoked. She has never used smokeless tobacco. She reports that she does not drink alcohol or use illicit drugs.   Family History   Problem Relation Age of Onset    Cancer Maternal Grandmother      unknonwn type    Cancer Paternal Grandmother      unknown type    Diabetes Maternal Aunt     Asthma Paternal Aunt      No Known Allergies    Problem List:      Patient Active Problem List   Diagnosis Code    Amenorrhea N91.2    Uterine enlargement N85.2    Endometrial hyperplasia without atypia, simple N85.01    Obesity E66.9    Hypertension I10    Hyperlipidemia E78.5    Genital warts A63.0    Severe obesity (BMI 35.0-39. 9) with comorbidity (HCC) E66.01       Medications:     Current Outpatient Prescriptions   Medication Sig    lisinopril-hydroCHLOROthiazide (PRINZIDE, ZESTORETIC) 10-12.5 mg per tablet Take 1 Tab by mouth daily.  norethindrone (MICRONOR) 0.35 mg tab Take 1 Tab by mouth daily. No current facility-administered medications for this visit.         Review of Systems:   (Positives in bold)   General:  fevers, chills, generalized weakness, fatigue, malaise, weight change, night sweats, decreased appetite  Neurologic: dizziness, lightheadedness, headaches, loss of consciousness, numbness, tingling, focal weakness, speech change,confusion, memory loss, gait or balance difficulty     Eyes:  vision changes, double vision, pain with eye movement, photophobia, excessive tearing, dry eyes, redness, discharge  Ears:  change in hearing, ear pain, ear discharge, ear ringing  Nose:  nosebleeds, sneezing, runny nose, nasal congestion  Mouth/Throat: sore throat, hoarse voice, difficulty swallowing  Neck:  pain, stiffness  Respiratory: dyspnea at rest, dyspnea on exertion, wheezing, cough, sputum  production, pain with deep breaths/inspiration, hemoptysis  Cardiovasc:   chest pain, palpitations, orthopnea, PND, pedal edema  Gastrointest: nausea, vomiting, abdominal pain, constipation, diarrhea, heart burn, bitter taste in back of throat, bloody stools, tarry black stools    Urinary: dysuria, hematuria, urinary frequency, nocturia, malodorous urine,  difficulty initiating flow, slow urine stream  Genital (F): Vaginal discharge, ulceration, rashes, itching, abnormal bleeding  Musculoskel: joint pain, joint stiffness, joint swelling, trauma, back pain, neck pain, decreased range of motion, focal muscle pain, diffuse myalgias   Psychiatric: abnormal mood swings, insomnia, anxiety, depression, hallucinations, suicidal ideation, homicidal ideation  Endocrine: polydipsia, polyuria, polyphagia, cold intolerance, heat intolerance  Hematologic:  easy bruising, easy bleeding  Dermatologic: Itching, rashes,     Physical Assessment:   VS:    Visit Vitals    BP (!) 168/107 (BP 1 Location: Right arm, BP Patient Position: Sitting)    Pulse 75    Temp (!) 49.8 °F (9.9 °C) (Oral)    Resp 16    Ht 5' 5\" (1.651 m)    Wt 237 lb (107.5 kg)    LMP 04/30/2018    SpO2 96%    BMI 39.44 kg/m2       General:  Well-groomed, obese, in no distress, pleasant, alert, appropriate and conversant. Eyes:   PERRL, EOMI. 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 Wearing glasses. Ears:   Ear canals clear; tympanic membranes without erythema or exudate  Mouth: MMM, good dentition, oropharynx without exudate, petechiae or ulcers  Neck: Neck supple, no swelling, mass or tenderness or thyromegaly; trachea midline       Cardiovasc:  No JVD. RRR,  no murmur, rubs or gallops. Pulmonary:   Normal respiratory effort. Lungs clear bilaterally, good air movement, no wheezing, rales or rhonchi. Abdomen:   Abdomen soft, normal bowel sounds. No masses, tenderness, rebound/rigidity or CVA tenderness. No hepatosplenomegaly. Extremities:  No redness, deformity, edema, tenderness on palpation,  LEs warm and well-perfused. Neuro:           Gait normal. Reflexes and motor strength normal and symmetric. Cranial nerves II-XII and sensation grossly intact. Skin:    No rashe or jaundice  MSK:   Normal full range of motion of joints, 5/5 muscle strength throughout. Psych:  Alert and oriented, no focal deficits. No facial asymmetry noted. No pressured speech or abnormal thought content. Reports being a little depressed for two weeks due to losing her job. Denies SI/HI    Pertinent diagnostic procedures include:  No results found for this or any previous visit (from the past 24 hour(s)).      Assessment/Plan & Orders: ICD-10-CM ICD-9-CM    1. Essential hypertension I10 401.9 lisinopril-hydroCHLOROthiazide (PRINZIDE, ZESTORETIC) 10-12.5 mg per tablet   2. Mixed hyperlipidemia E78.2 272.2    3. Severe obesity (BMI 35.0-39. 9) with comorbidity ([facility] [address] E66.01 278.01    4. Uterine enlargement N85.2 621.2    5. Endometrial hyperplasia without atypia, simple N85.01 621.31 norethindrone (MICRONOR) 0.35 mg tab   6. Genital warts A63.0 078.11    7. Routine general medical examination at health care facility Z00.00 V70.0 IN PATIENT SCREENED FOR DEPRESSION      CBC WITH AUTOMATED DIFF      HEMOGLOBIN A1C W/O EAG      LIPID PANEL      METABOLIC PANEL, COMPREHENSIVE      T4, FREE      TSH 3RD GENERATION      VITAMIN D, 25 HYDROXY      URINALYSIS W/ RFLX MICROSCOPIC      CBC WITH AUTOMATED DIFF      HEMOGLOBIN A1C W/O EAG      LIPID PANEL      METABOLIC PANEL, COMPREHENSIVE      T4, FREE      TSH 3RD GENERATION      VITAMIN D, 25 HYDROXY      URINALYSIS W/ RFLX MICROSCOPIC   8. Family planning Z30.09 V25.09    9. Encounter for immunization Z23 V03.89 diph,Pertuss,Acell,,Tet Vac-PF (ADACEL) 2 Lf-(2.5-5-3-5 mcg)-5Lf/0.5 mL susp   10. Encounter for screening mammogram for malignant neoplasm of breast Z12.31 V76.12 [facility] MAMMO BI SCREENING INCL CAD       Health maintenance screening   ---Diabetes- will check Hemoglobin A1C, FBG   ---lipid- will check fasting lipid panel    -- Vaccinations:     Influenza vaccine:     Pneumococcal vaccine:    Tdap: due    ----mammogram: ordered   ----BSE: periodically   ----PAP: last year     -- Weight:  Body mass index is 39.44 kg/(m^2). Discussed the number with her. The BMI follow up plan is as follows: Eat a healthy diet and exercise five times a week for 30 min a day. Sexually Active-  [address] partner  Birth Control : pills, condoms  No concerns for STD  Single  [id number]  Unemployed  Exercises sporadically     Plan was reviewed with patient, education material was explained and given.  Patient verbalized understanding. Follow up in three months for routine care or prn for acute care. Advance Care Planning:   Patient was offered the opportunity to discuss advance care planning YES   Does patient have an Advance Directive:  NO     Healthy lifestyle has been encouraged including avoidance of tobacco, limiting or avoiding alcohol intake, heart healthy diet which is low in cholesterol and saturated fat and contains fresh fruits, vegetables and whole grains and fiber, regular exercise with goals of 20-30 minutes 3-5 days weekly and maintaining an optimal BMI. I have spent over 25 minutes in face to face time with this pt in discussion with respect to the aforementioned problems, diagnoses and management plans. >50% of the time was spent counseling and coordinating care. I have discussed the diagnosis with the patient and the intended plan as seen in the above orders. The patient has received an after-visit summary along with patient information handout. I have discussed medication side effects and warnings with the patient as well. Pt verbalized understanding.       Echo CLEMENTS  [facility]  [address] [facility] [place], [address]  Phone [phone]  Fax [phone]

## 2018-05-10 NOTE — PATIENT INSTRUCTIONS
High Blood Pressure: Care Instructions  Your Care Instructions    If your blood pressure is usually above 140/90, you have high blood pressure, or hypertension. That means the top number is 140 or higher or the bottom number is 90 or higher, or both. Despite what a lot of people think, high blood pressure usually doesn't cause headaches or make you feel dizzy or lightheaded. It usually has no symptoms. But it does increase your risk for heart attack, stroke, and kidney or eye damage. The higher your blood pressure, the more your risk increases. Your doctor will give you a goal for your blood pressure. Your goal will be based on your health and your age. An example of a goal is to keep your blood pressure below 140/90. Lifestyle changes, such as eating healthy and being active, are always important to help lower blood pressure. You might also take medicine to reach your blood pressure goal.  Follow-up care is a key part of your treatment and safety. Be sure to make and go to all appointments, and call your doctor if you are having problems. It's also a good idea to know your test results and keep a list of the medicines you take. How can you care for yourself at home? Medical treatment  · If you stop taking your medicine, your blood pressure will go back up. You may take one or more types of medicine to lower your blood pressure. Be safe with medicines. Take your medicine exactly as prescribed. Call your doctor if you think you are having a problem with your medicine. · Talk to your doctor before you start taking aspirin every day. Aspirin can help certain people lower their risk of a heart attack or stroke. But taking aspirin isn't right for everyone, because it can cause serious bleeding. · See your doctor regularly. You may need to see the doctor more often at first or until your blood pressure comes down.   · If you are taking blood pressure medicine, talk to your doctor before you take decongestants or anti-inflammatory medicine, such as ibuprofen. Some of these medicines can raise blood pressure. · Learn how to check your blood pressure at home. Lifestyle changes  · Stay at a healthy weight. This is especially important if you put on weight around the waist. Losing even 10 pounds can help you lower your blood pressure. · If your doctor recommends it, get more exercise. Walking is a good choice. Bit by bit, increase the amount you walk every day. Try for at least 30 minutes on most days of the week. You also may want to swim, bike, or do other activities. · Avoid or limit alcohol. Talk to your doctor about whether you can drink any alcohol. · Try to limit how much sodium you eat to less than 2,300 milligrams (mg) a day. Your doctor may ask you to try to eat less than 1,500 mg a day. · Eat plenty of fruits (such as bananas and oranges), vegetables, legumes, whole grains, and low-fat dairy products. · Lower the amount of saturated fat in your diet. Saturated fat is found in animal products such as milk, cheese, and meat. Limiting these foods may help you lose weight and also lower your risk for heart disease. · Do not smoke. Smoking increases your risk for heart attack and stroke. If you need help quitting, talk to your doctor about stop-smoking programs and medicines. These can increase your chances of quitting for good. When should you call for help? Call 911 anytime you think you may need emergency care. This may mean having symptoms that suggest that your blood pressure is causing a serious heart or blood vessel problem. Your blood pressure may be over 180/110. ? For example, call 911 if:  ? · You have symptoms of a heart attack. These may include:  ¨ Chest pain or pressure, or a strange feeling in the chest.  ¨ Sweating. ¨ Shortness of breath. ¨ Nausea or vomiting.   ¨ Pain, pressure, or a strange feeling in the back, neck, jaw, or upper belly or in one or both shoulders or arms.  ¨ Lightheadedness or sudden weakness. ¨ A fast or irregular heartbeat. ? · You have symptoms of a stroke. These may include:  ¨ Sudden numbness, tingling, weakness, or loss of movement in your face, arm, or leg, especially on only one side of your body. ¨ Sudden vision changes. ¨ Sudden trouble speaking. ¨ Sudden confusion or trouble understanding simple statements. ¨ Sudden problems with walking or balance. ¨ A sudden, severe headache that is different from past headaches. ? · You have severe back or belly pain. ?Do not wait until your blood pressure comes down on its own. Get help right away. ?Call your doctor now or seek immediate care if:  ? · Your blood pressure is much higher than normal (such as 180/110 or higher), but you don't have symptoms. ? · You think high blood pressure is causing symptoms, such as:  ¨ Severe headache. ¨ Blurry vision. ? Watch closely for changes in your health, and be sure to contact your doctor if:  ? · Your blood pressure measures 140/90 or higher at least 2 times. That means the top number is 140 or higher or the bottom number is 90 or higher, or both. ? · You think you may be having side effects from your blood pressure medicine. ? · Your blood pressure is usually normal, but it goes above normal at least 2 times. Where can you learn more? Go to http://kerry-chucho.info/. Enter Y239 in the search box to learn more about \"High Blood Pressure: Care Instructions. \"  Current as of: September 21, 2016  Content Version: 11.4  © 3958-0161 [employer]. Care instructions adapted under license by [employer] (which disclaims liability or warranty for this information). If you have questions about a medical condition or this instruction, always ask your healthcare professional. [place] [address] any warranty or liability for your use of this information.        DASH Diet: Care Instructions  Your Care Instructions    The DASH diet is an eating plan that can help lower your blood pressure. DASH stands for Dietary Approaches to Stop Hypertension. Hypertension is high blood pressure. The DASH diet focuses on eating foods that are high in calcium, potassium, and magnesium. These nutrients can lower blood pressure. The foods that are highest in these nutrients are fruits, vegetables, low-fat dairy products, nuts, seeds, and legumes. But taking calcium, potassium, and magnesium supplements instead of eating foods that are high in those nutrients does not have the same effect. The DASH diet also includes whole grains, fish, and poultry. The DASH diet is one of several lifestyle changes your doctor may recommend to lower your high blood pressure. Your doctor may also want you to decrease the amount of sodium in your diet. Lowering sodium while following the DASH diet can lower blood pressure even further than just the DASH diet alone. Follow-up care is a key part of your treatment and safety. Be sure to make and go to all appointments, and call your doctor if you are having problems. It's also a good idea to know your test results and keep a list of the medicines you take. How can you care for yourself at home? Following the DASH diet  · Eat 4 to 5 servings of fruit each day. A serving is 1 medium-sized piece of fruit, ½ cup chopped or canned fruit, 1/4 cup dried fruit, or 4 ounces (½ cup) of fruit juice. Choose fruit more often than fruit juice. · Eat 4 to 5 servings of vegetables each day. A serving is 1 cup of lettuce or raw leafy vegetables, ½ cup of chopped or cooked vegetables, or 4 ounces (½ cup) of vegetable juice. Choose vegetables more often than vegetable juice. · Get 2 to 3 servings of low-fat and fat-free dairy each day. A serving is 8 ounces of milk, 1 cup of yogurt, or 1 ½ ounces of cheese. · Eat 6 to 8 servings of grains each day.  A serving is 1 slice of bread, 1 ounce of dry cereal, or ½ cup of cooked rice, pasta, or cooked cereal. Try to choose whole-grain products as much as possible. · Limit lean meat, poultry, and fish to 2 servings each day. A serving is 3 ounces, about the size of a deck of cards. · Eat 4 to 5 servings of nuts, seeds, and legumes (cooked dried beans, lentils, and split peas) each week. A serving is 1/3 cup of nuts, 2 tablespoons of seeds, or ½ cup of cooked beans or peas. · Limit fats and oils to 2 to 3 servings each day. A serving is 1 teaspoon of vegetable oil or 2 tablespoons of salad dressing. · Limit sweets and added sugars to 5 servings or less a week. A serving is 1 tablespoon jelly or jam, ½ cup sorbet, or 1 cup of lemonade. · Eat less than 2,300 milligrams (mg) of sodium a day. If you limit your sodium to 1,500 mg a day, you can lower your blood pressure even more. Tips for success  · Start small. Do not try to make dramatic changes to your diet all at once. You might feel that you are missing out on your favorite foods and then be more likely to not follow the plan. Make small changes, and stick with them. Once those changes become habit, add a few more changes. · Try some of the following:  ¨ Make it a goal to eat a fruit or vegetable at every meal and at snacks. This will make it easy to get the recommended amount of fruits and vegetables each day. ¨ Try yogurt topped with fruit and nuts for a snack or healthy dessert. ¨ Add lettuce, tomato, cucumber, and onion to sandwiches. ¨ Combine a ready-made pizza crust with low-fat mozzarella cheese and lots of vegetable toppings. Try using tomatoes, squash, spinach, broccoli, carrots, cauliflower, and onions. ¨ Have a variety of cut-up vegetables with a low-fat dip as an appetizer instead of chips and dip. ¨ Sprinkle sunflower seeds or chopped almonds over salads. Or try adding chopped walnuts or almonds to cooked vegetables. ¨ Try some vegetarian meals using beans and peas. Add garbanzo or kidney beans to salads. Make burritos and tacos with mashed orozco beans or black beans. Where can you learn more? Go to http://kerry-chucho.info/. Enter J459 in the search box to learn more about \"DASH Diet: Care Instructions. \"  Current as of: September 21, 2016  Content Version: 11.4  © 5017-5583 [employer]. Care instructions adapted under license by [employer] (which disclaims liability or warranty for this information). If you have questions about a medical condition or this instruction, always ask your healthcare professional. [place] [address] any warranty or liability for your use of this information. Low Sodium Diet (2,000 Milligram): Care Instructions  Your Care Instructions    Too much sodium causes your body to hold on to extra water. This can raise your blood pressure and force your heart and kidneys to work harder. In very serious cases, this could cause you to be put in the hospital. It might even be life-threatening. By limiting sodium, you will feel better and lower your risk of serious problems. The most common source of sodium is salt. People get most of the salt in their diet from canned, prepared, and packaged foods. Fast food and restaurant meals also are very high in sodium. Your doctor will probably limit your sodium to less than 2,000 milligrams (mg) a day. This limit counts all the sodium in prepared and packaged foods and any salt you add to your food. Follow-up care is a key part of your treatment and safety. Be sure to make and go to all appointments, and call your doctor if you are having problems. It's also a good idea to know your test results and keep a list of the medicines you take. How can you care for yourself at home? Read food labels  · Read labels on cans and food packages. The labels tell you how much sodium is in each serving. Make sure that you look at the serving size.  If you eat more than the serving size, you have eaten more sodium. · Food labels also tell you the Percent Daily Value for sodium. Choose products with low Percent Daily Values for sodium. · Be aware that sodium can come in forms other than salt, including monosodium glutamate (MSG), sodium citrate, and sodium bicarbonate (baking soda). MSG is often added to Asian food. When you eat out, you can sometimes ask for food without MSG or added salt. Buy low-sodium foods  · Buy foods that are labeled \"unsalted\" (no salt added), \"sodium-free\" (less than 5 mg of sodium per serving), or \"low-sodium\" (less than 140 mg of sodium per serving). Foods labeled \"reduced-sodium\" and \"light sodium\" may still have too much sodium. Be sure to read the label to see how much sodium you are getting. · Buy fresh vegetables, or frozen vegetables without added sauces. Buy low-sodium versions of canned vegetables, soups, and other canned goods. Prepare low-sodium meals  · Cut back on the amount of salt you use in cooking. This will help you adjust to the taste. Do not add salt after cooking. One teaspoon of salt has about 2,300 mg of sodium. · Take the salt shaker off the table. · Flavor your food with garlic, lemon juice, onion, vinegar, herbs, and spices. Do not use soy sauce, lite soy sauce, steak sauce, onion salt, garlic salt, celery salt, mustard, or ketchup on your food. · Use low-sodium salad dressings, sauces, and ketchup. Or make your own salad dressings and sauces without adding salt. · Use less salt (or none) when recipes call for it. You can often use half the salt a recipe calls for without losing flavor. Other foods such as rice, pasta, and grains do not need added salt. · Rinse canned vegetables, and cook them in fresh water. This removes some-but not all-of the salt. · Avoid water that is naturally high in sodium or that has been treated with water softeners, which add sodium. Call your local water company to find out the sodium content of your water supply.  If you buy bottled water, read the label and choose a sodium-free brand. Avoid high-sodium foods  · Avoid eating:  ¨ Smoked, cured, salted, and canned meat, fish, and poultry. ¨ Ham, dominguez, hot dogs, and luncheon meats. ¨ Regular, hard, and processed cheese and regular peanut butter. ¨ Crackers with salted tops, and other salted snack foods such as pretzels, chips, and salted popcorn. ¨ Frozen prepared meals, unless labeled low-sodium. ¨ Canned and dried soups, broths, and bouillon, unless labeled sodium-free or low-sodium. ¨ Canned vegetables, unless labeled sodium-free or low-sodium. ¨ [employer] fries, pizza, tacos, and other fast foods. ¨ Pickles, olives, ketchup, and other condiments, especially soy sauce, unless labeled sodium-free or low-sodium. Where can you learn more? Go to http://kerry-chucho.info/. Enter I551 in the search box to learn more about \"Low Sodium Diet (2,000 Milligram): Care Instructions. \"  Current as of: May 12, 2017  Content Version: 11.4  © 9236-3701 [employer]. Care instructions adapted under license by [employer] (which disclaims liability or warranty for this information). If you have questions about a medical condition or this instruction, always ask your healthcare professional. [place] [address] any warranty or liability for your use of this information. Stroke: Care Instructions  Your Care Instructions    You have had a stroke. This means that the blood flow to a part of your brain was blocked for some time, which damages the nerve cells in that part of the brain. The part of your body controlled by that part of your brain may not function properly now. The brain is an amazing organ that can heal itself to some degree. The stroke you had damaged part of your brain. But other parts of your brain may take over in some way for the damaged areas. You have already started this process.   Your doctor will talk with you about what you can do to prevent another stroke. High blood pressure, high cholesterol, and diabetes are all risk factors for stroke. If you have any of these conditions, work with your doctor to make sure they are under control. Other risk factors for stroke include being overweight, smoking, and not getting regular exercise. Going home may be hard for you and your family. The more you can try to do for yourself, the better. Remember to take each day one at a time. Follow-up care is a key part of your treatment and safety. Be sure to make and go to all appointments, and call your doctor if you are having problems. It's also a good idea to know your test results and keep a list of the medicines you take. How can you care for yourself at home? ? · Enter a stroke rehabilitation (rehab) program, if your doctor recommends it. Physical, speech, and occupational therapies can help you manage bathing, dressing, eating, and other basics of daily living. ? · Do not drive until your doctor says it is okay. ? · It is normal to feel sad or depressed after a stroke. If these feelings last, talk to your doctor. ? · If you are having problems with urine leakage, go to the bathroom at regular times, including when you first wake up and at bedtime. Also, limit fluids after dinner. ? · If you are constipated, drink plenty of fluids, enough so that your urine is light yellow or clear like water. If you have kidney, heart, or liver disease and have to limit fluids, talk with your doctor before you increase the amount of fluids you drink. Set up a regular time for using the toilet. If you continue to have constipation, your doctor may suggest using a bulking agent, such as Metamucil, or a stool softener, laxative, or enema. Medicines  ? · Take your medicines exactly as prescribed. Call your doctor if you think you are having a problem with your medicine. You may be taking several medicines.  ACE (angiotensin-converting enzyme) inhibitors, angiotensin II receptor blockers (ARBs), beta-blockers, diuretics (water pills), and calcium channel blockers control your blood pressure. Statins help lower cholesterol. Your doctor may also prescribe medicines for depression, pain, sleep problems, anxiety, or agitation. ? · If your doctor has given you a blood thinner to prevent another stroke, be sure you get instructions about how to take your medicine safely. Blood thinners can cause serious bleeding problems. ? · Do not take any over-the-counter medicines or herbal products without talking to your doctor first.   ? · If you take birth control pills or hormone therapy, talk to your doctor about whether they are right for you. ? For family members and caregivers  ? · Make the home safe. Set up a room so that your loved one does not have to climb stairs. Be sure the bathroom is on the same floor. Move throw rugs and furniture that could cause falls. Make sure that the lighting is good. Put grab bars and seats in tubs and showers. ? · Find out what your loved one can do and what he or she needs help with. Try not to do things for your loved one that your loved one can do on his or her own. Help him or her learn and practice new skills. ? · Visit and talk with your loved one often. Try doing activities together that you both enjoy, such as playing cards or board games. Keep in touch with your loved one's friends as much as you can. Encourage them to visit. ? · Take care of yourself. Do not try to do everything yourself. Ask other family members to help. Eat well, get enough rest, and take time to do things that you enjoy. Keep up with your own doctor visits, and make sure to take your medicines regularly. Get out of the house as much as you can. Join a local support group. Find out if you qualify for home health care visits to help with rehab or for adult day care. When should you call for help?   Call 911 anytime you think you may need emergency care. For example, call if:  ? · You have signs of another stroke. These may include:  ¨ Sudden numbness, tingling, weakness, or loss of movement in your face, arm, or leg, especially on only one side of your body. ¨ Sudden vision changes. ¨ Sudden trouble speaking. ¨ Sudden confusion or trouble understanding simple statements. ¨ Sudden problems with walking or balance. ¨ A sudden, severe headache that is different from past headaches. Call 911 even if these symptoms go away in a few minutes. ?Call your doctor now or seek immediate medical care if:  ? · You have new symptoms that may be related to your stroke, such as falls or trouble swallowing. ? Watch closely for changes in your health, and be sure to contact your doctor if you have any problems. Where can you learn more? Go to http://kerry-chucho.info/. Enter T744 in the search box to learn more about \"Stroke: Care Instructions. \"  Current as of: March 20, 2017  Content Version: 11.4  © 3318-2533 [employer]. Care instructions adapted under license by [employer] (which disclaims liability or warranty for this information). If you have questions about a medical condition or this instruction, always ask your healthcare professional. [place] [address] any warranty or liability for your use of this information. Vaginal Yeast Infection: Care Instructions  Your Care Instructions    A vaginal yeast infection is caused by too many yeast cells in the vagina. This is common in women of all ages. Itching, vaginal discharge and irritation, and other symptoms can bother you. But yeast infections don't often cause other health problems. Some medicines can increase your risk of getting a yeast infection. These include antibiotics, birth control pills, hormones, and steroids.  You may also be more likely to get a yeast infection if you are pregnant, have diabetes, douche, or wear tight clothes. With treatment, most yeast infections get better in 2 to 3 days. Follow-up care is a key part of your treatment and safety. Be sure to make and go to all appointments, and call your doctor if you are having problems. It's also a good idea to know your test results and keep a list of the medicines you take. How can you care for yourself at home? · Take your medicines exactly as prescribed. Call your doctor if you think you are having a problem with your medicine. · Ask your doctor about over-the-counter (OTC) medicines for yeast infections. They may cost less than prescription medicines. If you use an OTC treatment, read and follow all instructions on the label. · Do not use tampons while using a vaginal cream or suppository. The tampons can absorb the medicine. Use pads instead. · Wear loose cotton clothing. Do not wear nylon or other fabric that holds body heat and moisture close to the skin. · Try sleeping without underwear. · Do not scratch. Relieve itching with a cold pack or a cool bath. · Do not wash your vaginal area more than once a day. Use plain water or a mild, unscented soap. Air-dry the vaginal area. · Change out of wet swimsuits after swimming. · Do not have sex until you have finished your treatment. · Do not douche. When should you call for help? Call your doctor now or seek immediate medical care if:  ? · You have unexpected vaginal bleeding. ? · You have new or increased pain in your vagina or pelvis. ? Watch closely for changes in your health, and be sure to contact your doctor if:  ? · You have a fever. ? · You are not getting better after 2 days. ? · Your symptoms come back after you finish your medicines. Where can you learn more? Go to http://kerry-chucho.info/. Enter M033 in the search box to learn more about \"Vaginal Yeast Infection: Care Instructions. \"  Current as of: October 13, 2016  Content Version: 11.4  © 7489-3977 [employer]. Care instructions adapted under license by [employer] (which disclaims liability or warranty for this information). If you have questions about a medical condition or this instruction, always ask your healthcare professional. [address] any warranty or liability for your use of this information. Body Mass Index: Care Instructions  Your Care Instructions    Body mass index (BMI) can help you see if your weight is raising your risk for health problems. It uses a formula to compare how much you weigh with how tall you are. · A BMI lower than 18.5 is considered underweight. · A BMI between 18.5 and 24.9 is considered healthy. · A BMI between 25 and 29.9 is considered overweight. A BMI of 30 or higher is considered obese. If your BMI is in the normal range, it means that you have a lower risk for weight-related health problems. If your BMI is in the overweight or obese range, you may be at increased risk for weight-related health problems, such as high blood pressure, heart disease, stroke, arthritis or joint pain, and diabetes. If your BMI is in the underweight range, you may be at increased risk for health problems such as fatigue, lower protection (immunity) against illness, muscle loss, bone loss, hair loss, and hormone problems. BMI is just one measure of your risk for weight-related health problems. You may be at higher risk for health problems if you are not active, you eat an unhealthy diet, or you drink too much alcohol or use tobacco products. Follow-up care is a key part of your treatment and safety. Be sure to make and go to all appointments, and call your doctor if you are having problems. It's also a good idea to know your test results and keep a list of the medicines you take. How can you care for yourself at home? · Practice healthy eating habits.  This includes eating plenty of fruits, vegetables, whole grains, lean protein, and low-fat dairy.  · If your doctor recommends it, get more exercise. Walking is a good choice. Bit by bit, increase the amount you walk every day. Try for at least 30 minutes on most days of the week. · Do not smoke. Smoking can increase your risk for health problems. If you need help quitting, talk to your doctor about stop-smoking programs and medicines. These can increase your chances of quitting for good. · Limit alcohol to 2 drinks a day for men and 1 drink a day for women. Too much alcohol can cause health problems. If you have a BMI higher than 25  · Your doctor may do other tests to check your risk for weight-related health problems. This may include measuring the distance around your waist. A waist measurement of more than 40 inches in men or 35 inches in women can increase the risk of weight-related health problems. · Talk with your doctor about steps you can take to stay healthy or improve your health. You may need to make lifestyle changes to lose weight and stay healthy, such as changing your diet and getting regular exercise. If you have a BMI lower than 18.5  · Your doctor may do other tests to check your risk for health problems. · Talk with your doctor about steps you can take to stay healthy or improve your health. You may need to make lifestyle changes to gain or maintain weight and stay healthy, such as getting more healthy foods in your diet and doing exercises to build muscle. Where can you learn more? Go to http://kerry-chucho.info/. Enter S176 in the search box to learn more about \"Body Mass Index: Care Instructions. \"  Current as of: October 13, 2016  Content Version: 11.4  © 3786-1100 [employer] Inc[employer]. Care instructions adapted under license by [employer] (which disclaims liability or warranty for this information).  If you have questions about a medical condition or this instruction, always ask your healthcare professional. [employer] disclaims any warranty or liability for your use of this information. Learning About Low-Carbohydrate Diets for Weight Loss  What is a low-carbohydrate diet? Low-carb diets avoid foods that are high in carbohydrate. These high-carb foods include pasta, bread, rice, cereal, fruits, and starchy vegetables. Instead, these diets usually have you eat foods that are high in fat and protein. Many people lose weight quickly on a low-carb diet. But the early weight loss is water. People on this diet often gain the weight back after they start eating carbs again. Not all diet plans are safe or work well. A lot of the evidence shows that low-carb diets aren't healthy. That's because these diets often don't include healthy foods like fruits and vegetables. Losing weight safely means balancing protein, fat, and carbs with every meal and snack. And low-carb diets don't always provide the vitamins, minerals, and fiber you need. If you have a serious medical condition, talk to your doctor before you try any diet. These conditions include kidney disease, heart disease, type 2 diabetes, high cholesterol, and high blood pressure. If you are pregnant, it may not be safe for your baby if you are on a low-carb diet. How can you lose weight safely? You might have heard that a diet plan helped another person lose weight. But that doesn't mean that it will work for you. It is very hard to stay on a diet that includes lots of big changes in your eating habits. If you want to get to a healthy weight and stay there, making healthy lifestyle changes will often work better than dieting. These steps can help. · Make a plan for change. Work with your doctor to create a plan that is right for you. · See a dietitian. He or she can show you how to make healthy changes in your eating habits. · Manage stress. If you have a lot of stress in your life, it can be hard to focus on making healthy changes to your daily habits.   · Track your food and activity. You are likely to do better at losing weight if you keep track of what you eat and what you do. Follow-up care is a key part of your treatment and safety. Be sure to make and go to all appointments, and call your doctor if you are having problems. It's also a good idea to know your test results and keep a list of the medicines you take. Where can you learn more? Go to http://kerry-chucho.info/. Enter A121 in the search box to learn more about \"Learning About Low-Carbohydrate Diets for Weight Loss. \"  Current as of: May 12, 2017  Content Version: 11.4  © 3882-2054 [employer]. Care instructions adapted under license by [employer] (which disclaims liability or warranty for this information). If you have questions about a medical condition or this instruction, always ask your healthcare professional. [place] [address] any warranty or liability for your use of this information. Well Visit, Ages 25 to 48: Care Instructions  Your Care Instructions    Physical exams can help you stay healthy. Your doctor has checked your overall health and may have suggested ways to take good care of yourself. He or she also may have recommended tests. At home, you can help prevent illness with healthy eating, regular exercise, and other steps. Follow-up care is a key part of your treatment and safety. Be sure to make and go to all appointments, and call your doctor if you are having problems. It's also a good idea to know your test results and keep a list of the medicines you take. How can you care for yourself at home? · Reach and stay at a healthy weight. This will lower your risk for many problems, such as obesity, diabetes, heart disease, and high blood pressure. · Get at least 30 minutes of physical activity on most days of the week. Walking is a good choice.  You also may want to do other activities, such as running, swimming, cycling, or playing tennis or team sports. Discuss any changes in your exercise program with your doctor. · Do not smoke or allow others to smoke around you. If you need help quitting, talk to your doctor about stop-smoking programs and medicines. These can increase your chances of quitting for good. · Talk to your doctor about whether you have any risk factors for sexually transmitted infections (STIs). Having one sex partner (who does not have STIs and does not have sex with anyone else) is a good way to avoid these infections. · Use birth control if you do not want to have children at this time. Talk with your doctor about the choices available and what might be best for you. · Protect your skin from too much sun. When you're outdoors from 10 a.m. to 4 p.m., stay in the shade or cover up with clothing and a hat with a wide brim. Wear sunglasses that block UV rays. Even when it's cloudy, put broad-spectrum sunscreen (SPF 30 or higher) on any exposed skin. · See a dentist one or two times a year for checkups and to have your teeth cleaned. · Wear a seat belt in the car. · Drink alcohol in moderation, if at all. That means no more than 2 drinks a day for men and 1 drink a day for women. Follow your doctor's advice about when to have certain tests. These tests can spot problems early. For everyone  · Cholesterol. Have the fat (cholesterol) in your blood tested after age 21. Your doctor will tell you how often to have this done based on your age, family history, or other things that can increase your risk for heart disease. · Blood pressure. Have your blood pressure checked during a routine doctor visit. Your doctor will tell you how often to check your blood pressure based on your age, your blood pressure results, and other factors. · Vision. Talk with your doctor about how often to have a glaucoma test.  · Diabetes. Ask your doctor whether you should have tests for diabetes. · Colon cancer.  Have a test for colon cancer at age 48. You may have one of several tests. If you are younger than 48, you may need a test earlier if you have any risk factors. Risk factors include whether you already had a precancerous polyp removed from your colon or whether your parent, brother, sister, or child has had colon cancer. For women  · Breast exam and mammogram. Talk to your doctor about when you should have a clinical breast exam and a mammogram. Medical experts differ on whether and how often women under 50 should have these tests. Your doctor can help you decide what is right for you. · Pap test and pelvic exam. Begin Pap tests at age 24. A Pap test is the best way to find cervical cancer. The test often is part of a pelvic exam. Ask how often to have this test.  · Tests for sexually transmitted infections (STIs). Ask whether you should have tests for STIs. You may be at risk if you have sex with more than one person, especially if your partners do not wear condoms. For men  · Tests for sexually transmitted infections (STIs). Ask whether you should have tests for STIs. You may be at risk if you have sex with more than one person, especially if you do not wear a condom. · Testicular cancer exam. Ask your doctor whether you should check your testicles regularly. · Prostate exam. Talk to your doctor about whether you should have a blood test (called a PSA test) for prostate cancer. Experts differ on whether and when men should have this test. Some experts suggest it if you are older than 39 and are -American or have a father or brother who got prostate cancer when he was younger than 72. When should you call for help? Watch closely for changes in your health, and be sure to contact your doctor if you have any problems or symptoms that concern you. Where can you learn more? Go to http://kerry-chucho.info/.   Enter P072 in the search box to learn more about \"Well Visit, Ages 25 to 48: Care Instructions. \"  Current as of: May 12, 2017  Content Version: 11.4  © 0832-3691 [employer]. Care instructions adapted under license by [employer] (which disclaims liability or warranty for this information). If you have questions about a medical condition or this instruction, always ask your healthcare professional. [address] any warranty or liability for your use of this information. Prediabetes: Care Instructions  Your Care Instructions    Prediabetes is a warning sign that you are at risk for getting type 2 diabetes. It means that your blood sugar is higher than it should be. The food you eat turns into sugar, which your body uses for energy. Normally, an organ called the pancreas makes insulin, which allows the sugar in your blood to get into your body's cells. But when your body can't use insulin the right way, the sugar doesn't move into cells. It stays in your blood instead. This is called insulin resistance. The buildup of sugar in the blood causes prediabetes. The good news is that lifestyle changes may help you get your blood sugar back to normal and help you avoid or delay diabetes. Follow-up care is a key part of your treatment and safety. Be sure to make and go to all appointments, and call your doctor if you are having problems. It's also a good idea to know your test results and keep a list of the medicines you take. How can you care for yourself at home? · Watch your weight. A healthy weight helps your body use insulin properly. · Limit the amount of calories, sweets, and unhealthy fat you eat. Ask your doctor if you should see a dietitian. A registered dietitian can help you create meal plans that fit your lifestyle. · Get at least 30 minutes of exercise on most days of the week. Exercise helps control your blood sugar. It also helps you maintain a healthy weight. Walking is a good choice.  You also may want to do other activities, such as running, swimming, cycling, or playing tennis or team sports. · Do not smoke. Smoking can make prediabetes worse. If you need help quitting, talk to your doctor about stop-smoking programs and medicines. These can increase your chances of quitting for good. · If your doctor prescribed medicines, take them exactly as prescribed. Call your doctor if you think you are having a problem with your medicine. You will get more details on the specific medicines your doctor prescribes. When should you call for help? Watch closely for changes in your health, and be sure to contact your doctor if:  ? · You have any symptoms of diabetes. These may include:  ¨ Being thirsty more often. ¨ Urinating more. ¨ Being hungrier. ¨ Losing weight. ¨ Being very tired. ¨ Having blurry vision. ? · You have a wound that will not heal.   ? · You have an infection that will not go away. ? · You have problems with your blood pressure. ? · You want more information about diabetes and how you can keep from getting it. Where can you learn more? Go to http://kerry-chucho.info/. Enter I222 in the search box to learn more about \"Prediabetes: Care Instructions. \"  Current as of: March 13, 2017  Content Version: 11.4  © 7165-4198 Healthwise, Incorporated. Care instructions adapted under license by [employer] (which disclaims liability or warranty for this information). If you have questions about a medical condition or this instruction, always ask your healthcare professional. [address] any warranty or liability for your use of this information.

## 2018-05-11 LAB
25(OH)D3 SERPL-MCNC: 18 NG/ML (ref 32–100)
A-G RATIO,AGRAT: 1.5 RATIO (ref 1.1–2.6)
ABSOLUTE LYMPHOCYTE COUNT, 10803: 2.4 K/UL (ref 1–4.8)
ALBUMIN SERPL-MCNC: 4.2 G/DL (ref 3.5–5)
ALP SERPL-CCNC: 77 U/L (ref 25–115)
ALT SERPL-CCNC: 7 U/L (ref 5–40)
ANION GAP SERPL CALC-SCNC: 14 MMOL/L
AST SERPL W P-5'-P-CCNC: 11 U/L (ref 10–37)
AVG GLU, 10930: 116 MG/DL (ref 91–123)
BACTERIA,BACTU: PRESENT
BASOPHILS # BLD: 0 K/UL (ref 0–0.2)
BASOPHILS NFR BLD: 1 % (ref 0–2)
BILIRUB SERPL-MCNC: 0.2 MG/DL (ref 0.2–1.2)
BILIRUB UR QL: NEGATIVE
BUN SERPL-MCNC: 12 MG/DL (ref 6–22)
CALCIUM SERPL-MCNC: 9.2 MG/DL (ref 8.4–10.5)
CHLORIDE SERPL-SCNC: 101 MMOL/L (ref 98–110)
CHOLEST SERPL-MCNC: 193 MG/DL (ref 110–200)
CO2 SERPL-SCNC: 26 MMOL/L (ref 20–32)
CREAT SERPL-MCNC: 0.8 MG/DL (ref 0.5–1.2)
EOSINOPHIL # BLD: 0.2 K/UL (ref 0–0.5)
EOSINOPHIL NFR BLD: 3 % (ref 0–6)
EPITHELIAL,EPSU: ABNORMAL /HPF (ref 0–2)
ERYTHROCYTE [DISTWIDTH] IN BLOOD BY AUTOMATED COUNT: 16.4 % (ref 10–15.5)
GFRAA, 66117: >60
GFRNA, 66118: >60
GLOBULIN,GLOB: 2.8 G/DL (ref 2–4)
GLUCOSE SERPL-MCNC: 85 MG/DL (ref 70–99)
GLUCOSE UR QL: NEGATIVE MG/DL
GRANULOCYTES,GRANS: 52 % (ref 40–75)
HBA1C MFR BLD HPLC: 5.7 % (ref 4.8–5.9)
HCT VFR BLD AUTO: 38.9 % (ref 35.1–46.5)
HDLC SERPL-MCNC: 4.4 MG/DL (ref 0–5)
HDLC SERPL-MCNC: 44 MG/DL (ref 40–59)
HGB BLD-MCNC: 11.8 G/DL (ref 11.7–15.5)
HGB UR QL STRIP: ABNORMAL
KETONES UR QL STRIP.AUTO: NEGATIVE MG/DL
LDLC SERPL CALC-MCNC: 134 MG/DL (ref 50–99)
LEUKOCYTE ESTERASE: NEGATIVE
LYMPHOCYTES, LYMLT: 39 % (ref 20–45)
MCH RBC QN AUTO: 28 PG (ref 26–34)
MCHC RBC AUTO-ENTMCNC: 30 G/DL (ref 31–36)
MCV RBC AUTO: 91 FL (ref 80–95)
MONOCYTES # BLD: 0.3 K/UL (ref 0.1–1)
MONOCYTES NFR BLD: 5 % (ref 3–12)
NEUTROPHILS # BLD AUTO: 3.3 K/UL (ref 1.8–7.7)
NITRITE UR QL STRIP.AUTO: POSITIVE
PH UR STRIP: 5 PH (ref 5–8)
PLATELET # BLD AUTO: 283 K/UL (ref 140–440)
PMV BLD AUTO: 11.3 FL (ref 9–13)
POTASSIUM SERPL-SCNC: 4.4 MMOL/L (ref 3.5–5.5)
PROT SERPL-MCNC: 7 G/DL (ref 6.4–8.3)
PROT UR QL STRIP: NEGATIVE MG/DL
RBC # BLD AUTO: 4.27 M/UL (ref 3.8–5.2)
RBC #/AREA URNS HPF: ABNORMAL /HPF
SODIUM SERPL-SCNC: 141 MMOL/L (ref 133–145)
SP GR UR: 1.02 (ref 1–1.03)
T4 FREE SERPL-MCNC: 1 NG/DL (ref 0.9–1.8)
TRIGL SERPL-MCNC: 78 MG/DL (ref 40–149)
TSH SERPL DL<=0.005 MIU/L-ACNC: 1.41 MCU/ML (ref 0.27–4.2)
UROBILINOGEN UR STRIP-MCNC: <2 MG/DL
VLDLC SERPL CALC-MCNC: 16 MG/DL (ref 8–30)
WBC # BLD AUTO: 6.3 K/UL (ref 4–11)
WBC URNS QL MICRO: ABNORMAL /HPF (ref 0–2)

## 2018-05-15 ENCOUNTER — TELEPHONE (OUTPATIENT)
Dept: FAMILY MEDICINE CLINIC | Facility: CLINIC | Age: 43
End: 2018-05-15

## 2018-05-15 DIAGNOSIS — E55.9 VITAMIN D DEFICIENCY: Primary | ICD-10-CM

## 2018-05-15 DIAGNOSIS — R82.90 ABNORMAL FINDING ON URINALYSIS: ICD-10-CM

## 2018-05-15 RX ORDER — ASPIRIN 325 MG
50000 TABLET, DELAYED RELEASE (ENTERIC COATED) ORAL
Qty: 12 CAP | Refills: 1 | Status: SHIPPED | OUTPATIENT
Start: 2018-05-15 | End: 2018-08-01

## 2018-05-15 NOTE — TELEPHONE ENCOUNTER
Your vitamin D shows Deficiency @18 a prescription for supplement is sent to your pharmacy take as directed- one tablet every week for twelve weeks. Your urinalysis shows bacterials please return to the office to give urine for a urine culture. Your metabolic panel, thyroid labs and hemoglobin A1C are all normal.   Your LDL cholesterol is slightly elevated- lifestyle modifications needed to decrease level     Spoke with pt in regards to results. Pt acknowledges understanding and voices no concerns at this time.

## 2018-05-15 NOTE — PROGRESS NOTES
Your vitamin D shows Deficiency @18 a prescription for supplement is sent to your pharmacy take as directed- one tablet every week for twelve weeks. Your urinalysis shows bacterials please return to the office to give urine for a urine culture.   Your metabolic panel, thyroid labs and hemoglobin A1C are all normal.   Your LDL cholesterol is slightly elevated- lifestyle modifications needed to decrease level

## 2018-05-16 ENCOUNTER — HOSPITAL ENCOUNTER (OUTPATIENT)
Dept: MAMMOGRAPHY | Age: 43
Discharge: HOME | End: 2018-05-16
Attending: NURSE PRACTITIONER
Payer: COMMERCIAL

## 2018-05-16 DIAGNOSIS — Z12.31 VISIT FOR SCREENING MAMMOGRAM: ICD-10-CM

## 2018-05-16 PROCEDURE — 77063 BREAST TOMOSYNTHESIS BI: CPT

## 2018-05-17 ENCOUNTER — TELEPHONE (OUTPATIENT)
Dept: FAMILY MEDICINE CLINIC | Facility: CLINIC | Age: 43
End: 2018-05-17

## 2018-05-17 LAB — RESULT: NORMAL

## 2018-05-17 NOTE — TELEPHONE ENCOUNTER
Your mammogram is negative for malignancy- annual screening recommended. Your urine culture is normal- no UTI. Spoke with pt in regards to results. Pt acknowledges understanding and voices no concerns at this time.

## 2018-08-10 ENCOUNTER — OFFICE VISIT (OUTPATIENT)
Dept: FAMILY MEDICINE CLINIC | Facility: CLINIC | Age: 43
End: 2018-08-10

## 2018-08-10 VITALS
RESPIRATION RATE: 18 BRPM | BODY MASS INDEX: 39.82 KG/M2 | HEART RATE: 63 BPM | WEIGHT: 239 LBS | DIASTOLIC BLOOD PRESSURE: 96 MMHG | OXYGEN SATURATION: 98 % | HEIGHT: 65 IN | TEMPERATURE: 97.6 F | SYSTOLIC BLOOD PRESSURE: 150 MMHG

## 2018-08-10 DIAGNOSIS — I10 ESSENTIAL HYPERTENSION: ICD-10-CM

## 2018-08-10 RX ORDER — LISINOPRIL AND HYDROCHLOROTHIAZIDE 12.5; 2 MG/1; MG/1
1 TABLET ORAL 2 TIMES DAILY
Qty: 180 TAB | Refills: 0 | Status: SHIPPED | OUTPATIENT
Start: 2018-08-10 | End: 2018-12-28 | Stop reason: SDUPTHER

## 2018-08-10 RX ORDER — AMLODIPINE BESYLATE 10 MG/1
10 TABLET ORAL DAILY
Qty: 90 TAB | Refills: 0 | Status: SHIPPED | OUTPATIENT
Start: 2018-08-10 | End: 2018-12-03 | Stop reason: SDUPTHER

## 2018-08-10 NOTE — PATIENT INSTRUCTIONS
Home Blood Pressure Test: About This Test  What is it? A home blood pressure test allows you to keep track of your blood pressure at home. Blood pressure is a measure of the force of blood against the walls of your arteries. Blood pressure readings include two numbers, such as 130/80 (say \"130 over 80\"). The first number is the systolic pressure. The second number is the diastolic pressure. Why is this test done? You may do this test at home to:  · Find out if you have high blood pressure. · Track your blood pressure if you have high blood pressure. · Track how well medicine is working to reduce high blood pressure. · Check how lifestyle changes, such as weight loss and exercise, are affecting blood pressure. How can you prepare for the test?  · Do not use caffeine, tobacco, or medicines known to raise blood pressure (such as nasal decongestant sprays) for at least 30 minutes before taking your blood pressure. · Do not exercise for at least 30 minutes before taking your blood pressure. What happens before the test?  Take your blood pressure while you feel comfortable and relaxed. Sit quietly with both feet on the floor for at least 5 minutes before the test.  What happens during the test?  · Sit with your arm slightly bent and resting on a table so that your upper arm is at the same level as your heart. · Roll up your sleeve or take off your shirt to expose your upper arm. · Wrap the blood pressure cuff around your upper arm so that the lower edge of the cuff is about 1 inch above the bend of your elbow. Proceed with the following steps depending on if you are using an automatic or manual pressure monitor. Automatic blood pressure monitors  · Press the on/off button on the automatic monitor and wait until the ready-to-measure \"heart\" symbol appears next to zero in the display window. · Press the start button. The cuff will inflate and deflate by itself.   · Your blood pressure numbers will appear on the screen. · Write your numbers in your log book, along with the date and time. Manual blood pressure monitors  · Place the earpieces of a stethoscope in your ears, and place the bell of the stethoscope over the artery, just below the cuff. · Close the valve on the rubber inflating bulb. · Squeeze the bulb rapidly with your opposite hand to inflate the cuff until the dial or column of mercury reads about 30 mm Hg higher than your usual systolic pressure. If you do not know your usual pressure, inflate the cuff to 210 mm Hg or until the pulse at your wrist disappears. · Open the pressure valve just slightly by twisting or pressing the valve on the bulb. · As you watch the pressure slowly fall, note the level on the dial at which you first start to hear a pulsing or tapping sound through the stethoscope. This is your systolic blood pressure. · Continue letting the air out slowly. The sounds will become muffled and will finally disappear. Note the pressure when the sounds completely disappear. This is your diastolic blood pressure. Let out all the remaining air. · Write your numbers in your log book, along with the date and time. What else should you know about the test?  Here are the categories of blood pressure for adults:  Ideal blood pressure. Systolic is less than 230, and diastolic is less than 80. Elevated blood pressure. Systolic is 984 to 263, and diastolic is less than 80. High blood pressure (hypertension). Systolic is 833 or above. Diastolic is 80 or above. One or both numbers may be high. It is more accurate to take the average of several readings made throughout the day than to rely on a single reading. Follow-up care is a key part of your treatment and safety. Be sure to make and go to all appointments, and call your doctor if you are having problems. It's also a good idea to keep a list of the medicines you take. Where can you learn more?   Go to http://kerry-chucho.info/. Enter C427 in the search box to learn more about \"Home Blood Pressure Test: About This Test.\"  Current as of: December 6, 2017  Content Version: 11.7  © 7201-6660 [employer]. Care instructions adapted under license by [employer] (which disclaims liability or warranty for this information). If you have questions about a medical condition or this instruction, always ask your healthcare professional. [address] any warranty or liability for your use of this information. DASH Diet: Care Instructions  Your Care Instructions    The DASH diet is an eating plan that can help lower your blood pressure. DASH stands for Dietary Approaches to Stop Hypertension. Hypertension is high blood pressure. The DASH diet focuses on eating foods that are high in calcium, potassium, and magnesium. These nutrients can lower blood pressure. The foods that are highest in these nutrients are fruits, vegetables, low-fat dairy products, nuts, seeds, and legumes. But taking calcium, potassium, and magnesium supplements instead of eating foods that are high in those nutrients does not have the same effect. The DASH diet also includes whole grains, fish, and poultry. The DASH diet is one of several lifestyle changes your doctor may recommend to lower your high blood pressure. Your doctor may also want you to decrease the amount of sodium in your diet. Lowering sodium while following the DASH diet can lower blood pressure even further than just the DASH diet alone. Follow-up care is a key part of your treatment and safety. Be sure to make and go to all appointments, and call your doctor if you are having problems. It's also a good idea to know your test results and keep a list of the medicines you take. How can you care for yourself at home? Following the DASH diet  · Eat 4 to 5 servings of fruit each day.  A serving is 1 medium-sized piece of fruit, ½ cup chopped or canned fruit, 1/4 cup dried fruit, or 4 ounces (½ cup) of fruit juice. Choose fruit more often than fruit juice. · Eat 4 to 5 servings of vegetables each day. A serving is 1 cup of lettuce or raw leafy vegetables, ½ cup of chopped or cooked vegetables, or 4 ounces (½ cup) of vegetable juice. Choose vegetables more often than vegetable juice. · Get 2 to 3 servings of low-fat and fat-free dairy each day. A serving is 8 ounces of milk, 1 cup of yogurt, or 1 ½ ounces of cheese. · Eat 6 to 8 servings of grains each day. A serving is 1 slice of bread, 1 ounce of dry cereal, or ½ cup of cooked rice, pasta, or cooked cereal. Try to choose whole-grain products as much as possible. · Limit lean meat, poultry, and fish to 2 servings each day. A serving is 3 ounces, about the size of a deck of cards. · Eat 4 to 5 servings of nuts, seeds, and legumes (cooked dried beans, lentils, and split peas) each week. A serving is 1/3 cup of nuts, 2 tablespoons of seeds, or ½ cup of cooked beans or peas. · Limit fats and oils to 2 to 3 servings each day. A serving is 1 teaspoon of vegetable oil or 2 tablespoons of salad dressing. · Limit sweets and added sugars to 5 servings or less a week. A serving is 1 tablespoon jelly or jam, ½ cup sorbet, or 1 cup of lemonade. · Eat less than 2,300 milligrams (mg) of sodium a day. If you limit your sodium to 1,500 mg a day, you can lower your blood pressure even more. Tips for success  · Start small. Do not try to make dramatic changes to your diet all at once. You might feel that you are missing out on your favorite foods and then be more likely to not follow the plan. Make small changes, and stick with them. Once those changes become habit, add a few more changes. · Try some of the following:  ¨ Make it a goal to eat a fruit or vegetable at every meal and at snacks. This will make it easy to get the recommended amount of fruits and vegetables each day.   ¨ Try yogurt topped with fruit and nuts for a snack or healthy dessert. ¨ Add lettuce, tomato, cucumber, and onion to sandwiches. ¨ Combine a ready-made pizza crust with low-fat mozzarella cheese and lots of vegetable toppings. Try using tomatoes, squash, spinach, broccoli, carrots, cauliflower, and onions. ¨ Have a variety of cut-up vegetables with a low-fat dip as an appetizer instead of chips and dip. ¨ Sprinkle sunflower seeds or chopped almonds over salads. Or try adding chopped walnuts or almonds to cooked vegetables. ¨ Try some vegetarian meals using beans and peas. Add garbanzo or kidney beans to salads. Make burritos and tacos with mashed orozco beans or black beans. Where can you learn more? Go to http://kerry-chucho.info/. Enter J857 in the search box to learn more about \"DASH Diet: Care Instructions. \"  Current as of: December 6, 2017  Content Version: 11.7  © 9473-3478 [employer]. Care instructions adapted under license by [employer] (which disclaims liability or warranty for this information). If you have questions about a medical condition or this instruction, always ask your healthcare professional. [address] any warranty or liability for your use of this information. Low Sodium Diet (2,000 Milligram): Care Instructions  Your Care Instructions    Too much sodium causes your body to hold on to extra water. This can raise your blood pressure and force your heart and kidneys to work harder. In very serious cases, this could cause you to be put in the hospital. It might even be life-threatening. By limiting sodium, you will feel better and lower your risk of serious problems. The most common source of sodium is salt. People get most of the salt in their diet from canned, prepared, and packaged foods. Fast food and restaurant meals also are very high in sodium.  Your doctor will probably limit your sodium to less than 2,000 milligrams (mg) a day. This limit counts all the sodium in prepared and packaged foods and any salt you add to your food. Follow-up care is a key part of your treatment and safety. Be sure to make and go to all appointments, and call your doctor if you are having problems. It's also a good idea to know your test results and keep a list of the medicines you take. How can you care for yourself at home? Read food labels  · Read labels on cans and food packages. The labels tell you how much sodium is in each serving. Make sure that you look at the serving size. If you eat more than the serving size, you have eaten more sodium. · Food labels also tell you the Percent Daily Value for sodium. Choose products with low Percent Daily Values for sodium. · Be aware that sodium can come in forms other than salt, including monosodium glutamate (MSG), sodium citrate, and sodium bicarbonate (baking soda). MSG is often added to Asian food. When you eat out, you can sometimes ask for food without MSG or added salt. Buy low-sodium foods  · Buy foods that are labeled \"unsalted\" (no salt added), \"sodium-free\" (less than 5 mg of sodium per serving), or \"low-sodium\" (less than 140 mg of sodium per serving). Foods labeled \"reduced-sodium\" and \"light sodium\" may still have too much sodium. Be sure to read the label to see how much sodium you are getting. · Buy fresh vegetables, or frozen vegetables without added sauces. Buy low-sodium versions of canned vegetables, soups, and other canned goods. Prepare low-sodium meals  · Cut back on the amount of salt you use in cooking. This will help you adjust to the taste. Do not add salt after cooking. One teaspoon of salt has about 2,300 mg of sodium. · Take the salt shaker off the table. · Flavor your food with garlic, lemon juice, onion, vinegar, herbs, and spices. Do not use soy sauce, lite soy sauce, steak sauce, onion salt, garlic salt, celery salt, mustard, or ketchup on your food.   · Use low-sodium salad dressings, sauces, and ketchup. Or make your own salad dressings and sauces without adding salt. · Use less salt (or none) when recipes call for it. You can often use half the salt a recipe calls for without losing flavor. Other foods such as rice, pasta, and grains do not need added salt. · Rinse canned vegetables, and cook them in fresh water. This removes some-but not all-of the salt. · Avoid water that is naturally high in sodium or that has been treated with water softeners, which add sodium. Call your local water company to find out the sodium content of your water supply. If you buy bottled water, read the label and choose a sodium-free brand. Avoid high-sodium foods  · Avoid eating:  ¨ Smoked, cured, salted, and canned meat, fish, and poultry. ¨ Ham, dominguez, hot dogs, and luncheon meats. ¨ Regular, hard, and processed cheese and regular peanut butter. ¨ Crackers with salted tops, and other salted snack foods such as pretzels, chips, and salted popcorn. ¨ Frozen prepared meals, unless labeled low-sodium. ¨ Canned and dried soups, broths, and bouillon, unless labeled sodium-free or low-sodium. ¨ Canned vegetables, unless labeled sodium-free or low-sodium. ¨ Western [employer] fries, pizza, tacos, and other fast foods. ¨ Pickles, olives, ketchup, and other condiments, especially soy sauce, unless labeled sodium-free or low-sodium. Where can you learn more? Go to http://kerry-chucho.info/. Enter Y255 in the search box to learn more about \"Low Sodium Diet (2,000 Milligram): Care Instructions. \"  Current as of: May 12, 2017  Content Version: 11.7  © 0655-8763 [employer]. Care instructions adapted under license by [employer] (which disclaims liability or warranty for this information).  If you have questions about a medical condition or this instruction, always ask your healthcare professional. Marcela Jamil disclaims any warranty or liability for your use of this information. Kidney Disease and High Blood Pressure: Care Instructions  Your Care Instructions    Long-term (chronic) kidney disease happens when the kidneys cannot remove waste and keep your body's fluids and chemicals in balance. Usually, the kidneys remove waste from the blood through the urine. When the kidneys are not working well, waste can build up so much that it poisons the body. Kidney disease can make you very tired. It also can cause swelling, or edema, in your legs or other areas of your body. High blood pressure is one of the major causes of chronic kidney disease. And kidney disease can also cause high blood pressure. No matter which came first, having high blood pressure damages the tiny blood vessels in the kidneys. If you have high blood pressure, it is important to lower it. There are many things you can do to lower your blood pressure, which may help slow or stop the damage to your kidneys. Follow-up care is a key part of your treatment and safety. Be sure to make and go to all appointments, and call your doctor if you are having problems. It's also a good idea to know your test results and keep a list of the medicines you take. How can you care for yourself at home? · Be safe with medicines. Take your medicines exactly as prescribed. Call your doctor if you have any problems with your medicine. You will probably need more than one medicine to lower your blood pressure. You will get more details on the specific medicines your doctor prescribes. · Work with your doctor and a dietitian to plan meals that have the right amount of nutrients for you. You will probably have to limit salt, fluids, and protein. · Stay at a healthy weight. This is very important if you put on weight around the waist. Losing even 10 pounds can help you lower your blood pressure. · Manage other health problems such as diabetes and high cholesterol.  You can help lower your risk for heart disease and blood vessel problems with a healthy lifestyle along with medicines. · Do not take ibuprofen (Advil, Motrin) or naproxen (Aleve), or similar medicines, unless your doctor tells you to. They may make chronic kidney disease worse. It is okay to take acetaminophen (Tylenol). · If your doctor recommends it, get more exercise. Walking is a good choice. Bit by bit, increase the amount you walk every day. Try for at least 30 minutes on most days of the week. You also may want to swim, bike, or do other activities. · Limit or avoid alcohol. Talk to your doctor about whether you can drink any alcohol. · Do not smoke or allow others to smoke around you. If you need help quitting, talk to your doctor about stop-smoking programs and medicines. These can increase your chances of quitting for good. When should you call for help? Call 911 anytime you think you may need emergency care. For example, call if:    · You passed out (lost consciousness).    Call your doctor now or seek immediate medical care if:    · You have new or worse nausea and vomiting.     · You have much less urine than normal, or you have no urine.     · You are feeling confused or cannot think clearly.     · You have new or more blood in your urine.     · You have new swelling.     · You are dizzy or lightheaded, or you feel like you may faint.    Watch closely for changes in your health, and be sure to contact your doctor if:    · You do not get better as expected. Where can you learn more? Go to http://krery-chucho.info/. Enter I055 in the search box to learn more about \"Kidney Disease and High Blood Pressure: Care Instructions. \"  Current as of: May 12, 2017  Content Version: 11.7  © 4871-3512 [employer]. Care instructions adapted under license by [employer] (which disclaims liability or warranty for this information).  If you have questions about a medical condition or this instruction, always ask your healthcare professional. [place] [address] any warranty or liability for your use of this information.

## 2018-08-10 NOTE — MR AVS SNAPSHOT
[address] [address] 
[phone] Patient: Marlyn Gallagher MRN: [id number] TQB:[id number] Visit Information Date & Time Provider Department Dept. Phone Encounter #  
 8/10/2018  9:00 AM Molina Lobato  Linette Jalloh [phone] [id number] Follow-up Instructions Return in about 4 weeks (around 9/7/2018). Upcoming Health Maintenance Date Due DTaP/Tdap/Td series (1 - Tdap) 6/7/1997 Influenza Age 5 to Adult 8/1/2018 PAP AKA CERVICAL CYTOLOGY 5/15/2020 Allergies as of 8/10/2018  Review Complete On: 8/10/2018 By: Юлия Mcfarlane LPN No Known Allergies Current Immunizations  Never Reviewed No immunizations on file. Not reviewed this visit You Were Diagnosed With   
  
 Codes Comments Essential hypertension     ICD-10-CM: I10 
ICD-9-CM: 401.9 Vitals BP Pulse Temp Resp Height(growth percentile) Weight(growth percentile) (!) 150/96 (BP 1 Location: Right arm, BP Patient Position: Sitting) 63 97.6 °F (36.4 °C) (Oral) 18 5' 5\" (1.651 m) 239 lb (108.4 kg) SpO2 BMI OB Status Smoking Status 98% 39.77 kg/m2 Having regular periods Never Smoker Vitals History BMI and BSA Data Body Mass Index Body Surface Area  
 39.77 kg/m 2 2.23 m 2 Preferred Pharmacy Pharmacy Name Phone [facility] - [address], [address] [phone] Your Updated Medication List  
  
   
This list is accurate as of 8/10/18  9:56 AM.  Always use your most recent med list. amLODIPine 10 mg tablet Commonly known as:  Kerri Colon Take 1 Tab by mouth daily. lisinopril-hydroCHLOROthiazide 20-12.5 mg per tablet Commonly known as:  Gayathri [place] Take 1 Tab by mouth two (2) times a day. norethindrone 0.35 mg Tab Commonly known as:  Devang & Devang Take 1 Tab by mouth daily. Prescriptions Sent to Pharmacy Refills  
 lisinopril-hydroCHLOROthiazide (PRINZIDE, ZESTORETIC) 20-12.5 mg per tablet 0 Sig: Take 1 Tab by mouth two (2) times a day. Class: Normal  
 Pharmacy: [facility] [employer] Ph #: [phone] Route: Oral  
 amLODIPine (NORVASC) 10 mg tablet 0 Sig: Take 1 Tab by mouth daily. Class: Normal  
 Pharmacy: [facility] Ph #: [phone] Route: Oral  
  
Follow-up Instructions Return in about 4 weeks (around 9/7/2018). Patient Instructions Home Blood Pressure Test: About This Test 
What is it? A home blood pressure test allows you to keep track of your blood pressure at home. Blood pressure is a measure of the force of blood against the walls of your arteries. Blood pressure readings include two numbers, such as 130/80 (say \"130 over 80\"). The first number is the systolic pressure. The second number is the diastolic pressure. Why is this test done? You may do this test at home to: · Find out if you have high blood pressure. · Track your blood pressure if you have high blood pressure. · Track how well medicine is working to reduce high blood pressure. · Check how lifestyle changes, such as weight loss and exercise, are affecting blood pressure. How can you prepare for the test? 
· Do not use caffeine, tobacco, or medicines known to raise blood pressure (such as nasal decongestant sprays) for at least 30 minutes before taking your blood pressure. · Do not exercise for at least 30 minutes before taking your blood pressure. What happens before the test? 
Take your blood pressure while you feel comfortable and relaxed. Sit quietly with both feet on the floor for at least 5 minutes before the test. 
What happens during the test? 
· Sit with your arm slightly bent and resting on a table so that your upper arm is at the same level as your heart. · Roll up your sleeve or take off your shirt to expose your upper arm. · Wrap the blood pressure cuff around your upper arm so that the lower edge of the cuff is about 1 inch above the bend of your elbow. Proceed with the following steps depending on if you are using an automatic or manual pressure monitor. Automatic blood pressure monitors · Press the on/off button on the automatic monitor and wait until the ready-to-measure \"heart\" symbol appears next to zero in the display window. · Press the start button. The cuff will inflate and deflate by itself. · Your blood pressure numbers will appear on the screen. · Write your numbers in your log book, along with the date and time. Manual blood pressure monitors · Place the earpieces of a stethoscope in your ears, and place the bell of the stethoscope over the artery, just below the cuff. · Close the valve on the rubber inflating bulb. · Squeeze the bulb rapidly with your opposite hand to inflate the cuff until the dial or column of mercury reads about 30 mm Hg higher than your usual systolic pressure. If you do not know your usual pressure, inflate the cuff to 210 mm Hg or until the pulse at your wrist disappears. · Open the pressure valve just slightly by twisting or pressing the valve on the bulb. · As you watch the pressure slowly fall, note the level on the dial at which you first start to hear a pulsing or tapping sound through the stethoscope. This is your systolic blood pressure. · Continue letting the air out slowly. The sounds will become muffled and will finally disappear. Note the pressure when the sounds completely disappear. This is your diastolic blood pressure. Let out all the remaining air. · Write your numbers in your log book, along with the date and time. What else should you know about the test? 
Here are the categories of blood pressure for adults: 
Ideal blood pressure. Systolic is less than 216, and diastolic is less than 80. Elevated blood pressure. Systolic is 788 to 352, and diastolic is less than 80. High blood pressure (hypertension). Systolic is 077 or above. Diastolic is 80 or above. One or both numbers may be high. It is more accurate to take the average of several readings made throughout the day than to rely on a single reading. Follow-up care is a key part of your treatment and safety. Be sure to make and go to all appointments, and call your doctor if you are having problems. It's also a good idea to keep a list of the medicines you take. Where can you learn more? Go to http://kerry-chucho.info/. Enter C427 in the search box to learn more about \"Home Blood Pressure Test: About This Test.\" Current as of: December 6, 2017 Content Version: 11.7 © 8352-5303 B[employer]. Care instructions adapted under license by [employer] (which disclaims liability or warranty for this information). If you have questions about a medical condition or this instruction, always ask your healthcare professional. [place] [address] any warranty or liability for your use of this information. DASH Diet: Care Instructions Your Care Instructions The DASH diet is an eating plan that can help lower your blood pressure. DASH stands for Dietary Approaches to Stop Hypertension. Hypertension is high blood pressure. The DASH diet focuses on eating foods that are high in calcium, potassium, and magnesium. These nutrients can lower blood pressure. The foods that are highest in these nutrients are fruits, vegetables, low-fat dairy products, nuts, seeds, and legumes. But taking calcium, potassium, and magnesium supplements instead of eating foods that are high in those nutrients does not have the same effect. The DASH diet also includes whole grains, fish, and poultry.  
The DASH diet is one of several lifestyle changes your doctor may recommend to lower your high blood pressure. Your doctor may also want you to decrease the amount of sodium in your diet. Lowering sodium while following the DASH diet can lower blood pressure even further than just the DASH diet alone. Follow-up care is a key part of your treatment and safety. Be sure to make and go to all appointments, and call your doctor if you are having problems. It's also a good idea to know your test results and keep a list of the medicines you take. How can you care for yourself at home? Following the DASH diet · Eat 4 to 5 servings of fruit each day. A serving is 1 medium-sized piece of fruit, ½ cup chopped or canned fruit, 1/4 cup dried fruit, or 4 ounces (½ cup) of fruit juice. Choose fruit more often than fruit juice. · Eat 4 to 5 servings of vegetables each day. A serving is 1 cup of lettuce or raw leafy vegetables, ½ cup of chopped or cooked vegetables, or 4 ounces (½ cup) of vegetable juice. Choose vegetables more often than vegetable juice. · Get 2 to 3 servings of low-fat and fat-free dairy each day. A serving is 8 ounces of milk, 1 cup of yogurt, or 1 ½ ounces of cheese. · Eat 6 to 8 servings of grains each day. A serving is 1 slice of bread, 1 ounce of dry cereal, or ½ cup of cooked rice, pasta, or cooked cereal. Try to choose whole-grain products as much as possible. · Limit lean meat, poultry, and fish to 2 servings each day. A serving is 3 ounces, about the size of a deck of cards. · Eat 4 to 5 servings of nuts, seeds, and legumes (cooked dried beans, lentils, and split peas) each week. A serving is 1/3 cup of nuts, 2 tablespoons of seeds, or ½ cup of cooked beans or peas. · Limit fats and oils to 2 to 3 servings each day. A serving is 1 teaspoon of vegetable oil or 2 tablespoons of salad dressing. · Limit sweets and added sugars to 5 servings or less a week. A serving is 1 tablespoon jelly or jam, ½ cup sorbet, or 1 cup of lemonade. · Eat less than 2,300 milligrams (mg) of sodium a day. If you limit your sodium to 1,500 mg a day, you can lower your blood pressure even more. Tips for success · Start small. Do not try to make dramatic changes to your diet all at once. You might feel that you are missing out on your favorite foods and then be more likely to not follow the plan. Make small changes, and stick with them. Once those changes become habit, add a few more changes. · Try some of the following: ¨ Make it a goal to eat a fruit or vegetable at every meal and at snacks. This will make it easy to get the recommended amount of fruits and vegetables each day. ¨ Try yogurt topped with fruit and nuts for a snack or healthy dessert. ¨ Add lettuce, tomato, cucumber, and onion to sandwiches. ¨ Combine a ready-made pizza crust with low-fat mozzarella cheese and lots of vegetable toppings. Try using tomatoes, squash, spinach, broccoli, carrots, cauliflower, and onions. ¨ Have a variety of cut-up vegetables with a low-fat dip as an appetizer instead of chips and dip. ¨ Sprinkle sunflower seeds or chopped almonds over salads. Or try adding chopped walnuts or almonds to cooked vegetables. ¨ Try some vegetarian meals using beans and peas. Add garbanzo or kidney beans to salads. Make burritos and tacos with mashed orozco beans or black beans. Where can you learn more? Go to http://kerry-chucho.info/. Enter A766 in the search box to learn more about \"DASH Diet: Care Instructions. \" Current as of: December 6, 2017 Content Version: 11.7 © 5046-8834 [email or web address]. Care instructions adapted under license by [employer] (which disclaims liability or warranty for this information). If you have questions about a medical condition or this instruction, always ask your healthcare professional. [address] any warranty or liability for your use of this information. Low Sodium Diet (2,000 Milligram): Care Instructions Your Care Instructions Too much sodium causes your body to hold on to extra water. This can raise your blood pressure and force your heart and kidneys to work harder. In very serious cases, this could cause you to be put in the hospital. It might even be life-threatening. By limiting sodium, you will feel better and lower your risk of serious problems. The most common source of sodium is salt. People get most of the salt in their diet from canned, prepared, and packaged foods. Fast food and restaurant meals also are very high in sodium. Your doctor will probably limit your sodium to less than 2,000 milligrams (mg) a day. This limit counts all the sodium in prepared and packaged foods and any salt you add to your food. Follow-up care is a key part of your treatment and safety. Be sure to make and go to all appointments, and call your doctor if you are having problems. It's also a good idea to know your test results and keep a list of the medicines you take. How can you care for yourself at home? Read food labels · Read labels on cans and food packages. The labels tell you how much sodium is in each serving. Make sure that you look at the serving size. If you eat more than the serving size, you have eaten more sodium. · Food labels also tell you the Percent Daily Value for sodium. Choose products with low Percent Daily Values for sodium. · Be aware that sodium can come in forms other than salt, including monosodium glutamate (MSG), sodium citrate, and sodium bicarbonate (baking soda). MSG is often added to Asian food. When you eat out, you can sometimes ask for food without MSG or added salt. Buy low-sodium foods · Buy foods that are labeled \"unsalted\" (no salt added), \"sodium-free\" (less than 5 mg of sodium per serving), or \"low-sodium\" (less than 140 mg of sodium per serving).  Foods labeled \"reduced-sodium\" and \"light sodium\" may still have too much sodium. Be sure to read the label to see how much sodium you are getting. · Buy fresh vegetables, or frozen vegetables without added sauces. Buy low-sodium versions of canned vegetables, soups, and other canned goods. Prepare low-sodium meals · Cut back on the amount of salt you use in cooking. This will help you adjust to the taste. Do not add salt after cooking. One teaspoon of salt has about 2,300 mg of sodium. · Take the salt shaker off the table. · Flavor your food with garlic, lemon juice, onion, vinegar, herbs, and spices. Do not use soy sauce, lite soy sauce, steak sauce, onion salt, garlic salt, celery salt, mustard, or ketchup on your food. · Use low-sodium salad dressings, sauces, and ketchup. Or make your own salad dressings and sauces without adding salt. · Use less salt (or none) when recipes call for it. You can often use half the salt a recipe calls for without losing flavor. Other foods such as rice, pasta, and grains do not need added salt. · Rinse canned vegetables, and cook them in fresh water. This removes some-but not all-of the salt. · Avoid water that is naturally high in sodium or that has been treated with water softeners, which add sodium. Call your local water company to find out the sodium content of your water supply. If you buy bottled water, read the label and choose a sodium-free brand. Avoid high-sodium foods · Avoid eating: ¨ Smoked, cured, salted, and canned meat, fish, and poultry. ¨ Ham, dominguez, hot dogs, and luncheon meats. ¨ Regular, hard, and processed cheese and regular peanut butter. ¨ Crackers with salted tops, and other salted snack foods such as pretzels, chips, and salted popcorn. ¨ Frozen prepared meals, unless labeled low-sodium. ¨ Canned and dried soups, broths, and bouillon, unless labeled sodium-free or low-sodium. ¨ Canned vegetables, unless labeled sodium-free or low-sodium. ¨ Western Stephanie fries, pizza, tacos, and other fast foods. ¨ Pickles, olives, ketchup, and other condiments, especially soy sauce, unless labeled sodium-free or low-sodium. Where can you learn more? Go to http://kerry-chcuho.info/. Enter U391 in the search box to learn more about \"Low Sodium Diet (2,000 Milligram): Care Instructions. \" Current as of: May 12, 2017 Content Version: 11.7 © 3560-1734 [employer]. Care instructions adapted under license by [employer] (which disclaims liability or warranty for this information). If you have questions about a medical condition or this instruction, always ask your healthcare professional. [address] any warranty or liability for your use of this information. Kidney Disease and High Blood Pressure: Care Instructions Your Care Instructions Long-term (chronic) kidney disease happens when the kidneys cannot remove waste and keep your body's fluids and chemicals in balance. Usually, the kidneys remove waste from the blood through the urine. When the kidneys are not working well, waste can build up so much that it poisons the body. Kidney disease can make you very tired. It also can cause swelling, or edema, in your legs or other areas of your body. High blood pressure is one of the major causes of chronic kidney disease. And kidney disease can also cause high blood pressure. No matter which came first, having high blood pressure damages the tiny blood vessels in the kidneys. If you have high blood pressure, it is important to lower it. There are many things you can do to lower your blood pressure, which may help slow or stop the damage to your kidneys. Follow-up care is a key part of your treatment and safety. Be sure to make and go to all appointments, and call your doctor if you are having problems. It's also a good idea to know your test results and keep a list of the medicines you take. How can you care for yourself at home? · Be safe with medicines. Take your medicines exactly as prescribed. Call your doctor if you have any problems with your medicine. You will probably need more than one medicine to lower your blood pressure. You will get more details on the specific medicines your doctor prescribes. · Work with your doctor and a dietitian to plan meals that have the right amount of nutrients for you. You will probably have to limit salt, fluids, and protein. · Stay at a healthy weight. This is very important if you put on weight around the waist. Losing even 10 pounds can help you lower your blood pressure. · Manage other health problems such as diabetes and high cholesterol. You can help lower your risk for heart disease and blood vessel problems with a healthy lifestyle along with medicines. · Do not take ibuprofen (Advil, Motrin) or naproxen (Aleve), or similar medicines, unless your doctor tells you to. They may make chronic kidney disease worse. It is okay to take acetaminophen (Tylenol). · If your doctor recommends it, get more exercise. Walking is a good choice. Bit by bit, increase the amount you walk every day. Try for at least 30 minutes on most days of the week. You also may want to swim, bike, or do other activities. · Limit or avoid alcohol. Talk to your doctor about whether you can drink any alcohol. · Do not smoke or allow others to smoke around you. If you need help quitting, talk to your doctor about stop-smoking programs and medicines. These can increase your chances of quitting for good. When should you call for help? Call 911 anytime you think you may need emergency care. For example, call if: 
  · You passed out (lost consciousness).  
 Call your doctor now or seek immediate medical care if: 
  · You have new or worse nausea and vomiting.  
  · You have much less urine than normal, or you have no urine.  
  · You are feeling confused or cannot think clearly.   · You have new or more blood in your urine.  
  · You have new swelling.  
  · You are dizzy or lightheaded, or you feel like you may faint.  
 Watch closely for changes in your health, and be sure to contact your doctor if: 
  · You do not get better as expected. Where can you learn more? Go to http://kerry-chucho.info/. Enter J899 in the search box to learn more about \"Kidney Disease and High Blood Pressure: Care Instructions. \" Current as of: May 12, 2017 Content Version: 11.7 © 9554-1919 [employer]. Care instructions adapted under license by [employer] (which disclaims liability or warranty for this information). If you have questions about a medical condition or this instruction, always ask your healthcare professional. [address] any warranty or liability for your use of this information. Introducing [facility]! [facility] introduces Grooveshark patient portal. Now you can access parts of your medical record, email your doctor's office, and request medication refills online. 1. In your internet browser, go to [email or web address] 2. Click on the First Time User? Click Here link in the Sign In box. You will see the New Member Sign Up page. 3. Enter your Grooveshark Access Code exactly as it appears below. You will not need to use this code after youve completed the sign-up process. If you do not sign up before the expiration date, you must request a new code. · Grooveshark Access Code: [email or web address] Expires: 11/8/2018  9:56 AM 
 
4. Enter the last four digits of your Social Security Number (xxxx) and Date of Birth (mm/dd/yyyy) as indicated and click Submit. You will be taken to the next sign-up page. 5. Create a Grooveshark ID. This will be your Grooveshark login ID and cannot be changed, so think of one that is secure and easy to remember. 6. Create a Biscoot password. You can change your password at any time. 7. Enter your Password Reset Question and Answer. This can be used at a later time if you forget your password. 8. Enter your e-mail address. You will receive e-mail notification when new information is available in [address] 9. Click Sign Up. You can now view and download portions of your medical record. 10. Click the Download Summary menu link to download a portable copy of your medical information. If you have questions, please visit the Frequently Asked Questions section of the Biscoot website. Remember, Biscoot is NOT to be used for urgent needs. For medical emergencies, dial 911. Now available from your iPhone and Android! Please provide this summary of care documentation to your next provider. Your primary care clinician is listed as NONE. If you have any questions after today's visit, please call [phone].

## 2018-08-10 NOTE — PROGRESS NOTES
Arnaldo Virk is a 44 y.o.  female presents today for office visit for follow up. Pt would also like to discuss HTN obesity,cholesterol problem. . Pt is not fasting. Pt is in Room # 9    Patient has not been taking BP medication. 1. Have you been to the ER, urgent care clinic since your last visit? Hospitalized since your last visit? No    2. Have you seen or consulted any other health care providers outside of the [facility] since your last visit? Include any pap smears or colon screening. No    Upcoming Appts  none    Health Maintenance reviewed     VORB: No orders of the defined types were placed in this encounter.   Nabor Franco LPN

## 2018-08-10 NOTE — PROGRESS NOTES
Hypertension Follow Up Progress Note  Today's Date:  8/10/2018   Patient's Name: Barbara Ponce   Patient's :  1975     Subjective:     Barbara Ponce is a 44 y.o. female who presents for follow up of hypertension. Diet and Lifestyle: not attempting to follow a low fat, low cholesterol diet, not attempting to follow a low sodium diet, exercises sporadically, nonsmoker, caffeine intake soda once in a while, alcohol intake vodka and lemonade once a week. Home BP Monitoring: is not measured at home. She have a machine but it has no batteries. Cardiovascular ROS: not taking medications regularly as instructed, patient does not perform home BP monitoring, no chest pain on exertion, no dyspnea on exertion, no swelling of ankles.      Review of Systems:   Constitutional: negative for fevers, chills, sweats, fatigue and malaise  Eyes: negative for visual disturbance  Respiratory: negative for cough or dyspnea on exertion  Cardiovascular: negative for chest pain, dyspnea, palpitations, irregular heart beats, fatigue, paroxysmal nocturnal dyspnea, exertional chest pressure/discomfort, lower extremity edema, tachypnea, dyspnea on exertion  Gastrointestinal: negative for nausea, vomiting, change in bowel habits, melena, diarrhea, constipation and abdominal pain  Hematologic/lymphatic: negative for easy bruising, bleeding and blood in stool or urine  Musculoskeletal:negative for myalgias, stiff joints, neck pain, back pain and muscle weakness  Neurological: negative for headaches, dizziness, vertigo, memory problems, speech problems, paresthesia, coordination problems, gait problems and weakness  Behavioral/Psych: positive for good mood    Hemoglobin A1c   Date Value Ref Range Status   05/10/2018 5.7 4.8 - 5.9 % Final     BUN   Date Value Ref Range Status   05/10/2018 12 6 - 22 mg/dL Final     Creatinine   Date Value Ref Range Status   05/10/2018 0.8 0.5 - 1.2 mg/dL Final     GFR est AA   Date Value Ref Range Status   10/12/2017 >60 >60 ml/min/1.73m2 Final     VITAMIN D, 25-HYDROXY   Date Value Ref Range Status   05/10/2018 18.0 (L) 32.0 - 100.0 ng/mL Final     Hemoglobin A1c   Date Value Ref Range Status   05/10/2018 5.7 4.8 - 5.9 % Final     BUN   Date Value Ref Range Status   05/10/2018 12 6 - 22 mg/dL Final     Creatinine   Date Value Ref Range Status   05/10/2018 0.8 0.5 - 1.2 mg/dL Final     GFR est AA   Date Value Ref Range Status   10/12/2017 >60 >60 ml/min/1.73m2 Final     VITAMIN D, 25-HYDROXY   Date Value Ref Range Status   05/10/2018 18.0 (L) 32.0 - 100.0 ng/mL Final       Objective:     Visit Vitals    Pulse 63    Temp 97.6 °F (36.4 °C) (Oral)    Resp 18    Ht 5' 5\" (1.651 m)    Wt 239 lb (108.4 kg)    SpO2 98%    BMI 39.77 kg/m2     Appearance: alert, well appearing, and in no distress and oriented to person, place, and time. Cardiovascular: Normal rate and regular rhythm, no gallop, no murmur., S1, S2 normal and no JVD   Pulmonary/Chest: Effort normal and breath sounds normal. No respiratory distress. No wheezes or rales  Lower Extremities (feet): warm, good capillary refill  Lab review:  Orders Only on 05/15/2018   Component Date Value Ref Range Status    RESULT 05/15/2018 Normal   Final    Comment: Updated: [id number]  LAB [facility]#: [id number]  SOURCE: Clean Catch Urine  --FINAL REPORT--  Mixed Culture  <100,000 col/mL, more than 2 different organisms. Culture appears   contaminated  with skin edin.      Office Visit on 05/10/2018   Component Date Value Ref Range Status    WBC 05/10/2018 6.3  4.0 - 11.0 K/uL Final    RBC 05/10/2018 4.27  3.80 - 5.20 M/uL Final    HGB 05/10/2018 11.8  11.7 - 15.5 g/dL Final    HCT 05/10/2018 38.9  35.1 - 46.5 % Final    MCV 05/10/2018 91  80 - 95 fL Final    MCH 05/10/2018 28  26 - 34 pg Final    MCHC 05/10/2018 30* 31 - 36 g/dL Final    RDW 05/10/2018 16.4* 10.0 - 15.5 % Final    PLATELET 85/91/4051 888  140 - 440 K/uL Final    MPV 05/10/2018 11.3 9.0 - 13.0 fL Final    NEUTROPHILS 05/10/2018 52  40 - 75 % Final    Lymphocytes 05/10/2018 39  20 - 45 % Final    MONOCYTES 05/10/2018 5  3 - 12 % Final    EOSINOPHILS 05/10/2018 3  0 - 6 % Final    BASOPHILS 05/10/2018 1  0 - 2 % Final    ABS. NEUTROPHILS 05/10/2018 3.3  1.8 - 7.7 K/uL Final    ABSOLUTE LYMPHOCYTE COUNT 05/10/2018 2.4  1.0 - 4.8 K/uL Final    ABS. MONOCYTES 05/10/2018 0.3  0.1 - 1.0 K/uL Final    ABS. EOSINOPHILS 05/10/2018 0.2  0.0 - 0.5 K/uL Final    ABS. BASOPHILS 05/10/2018 0.0  0.0 - 0.2 K/uL Final    Hemoglobin A1c 05/10/2018 5.7  4.8 - 5.9 % Final    AVG GLU 05/10/2018 116  91 - 123 mg/dL Final    Triglyceride 05/10/2018 78  40 - 149 mg/dL Final    HDL Cholesterol 05/10/2018 44  40 - 59 mg/dL Final    Cholesterol, total 05/10/2018 193  110 - 200 mg/dL Final    CHOLESTEROL/HDL 05/10/2018 4.4  0.0 - 5.0 Final    LDL, calculated 05/10/2018 134* 50 - 99 mg/dL Final    VLDL, calculated 05/10/2018 16  8 - 30 mg/dL Final    Comment: Test includes cholesterol, HDL cholesterol, triglycerides and LDL. Cholesterol Recommended NCEP guidelines in mg/dL:  Less than 200            Desirable  200 - 239                Borderline High  Greater than or  = 240   High  Please Note:  Total Chol/HDL Ratio                   Men     Women  1/2 Avg. Risk    3.4     3.3      Avg. Risk    5.0     4.4  2X  Avg. Risk    9.6     7.1  3X  Avg. Risk   23.4    11.0      Glucose 05/10/2018 85  70 - 99 mg/dL Final    BUN 05/10/2018 12  6 - 22 mg/dL Final    Creatinine 05/10/2018 0.8  0.5 - 1.2 mg/dL Final    Sodium 05/10/2018 141  133 - 145 mmol/L Final    Potassium 05/10/2018 4.4  3.5 - 5.5 mmol/L Final    Chloride 05/10/2018 101  98 - 110 mmol/L Final    CO2 05/10/2018 26  20 - 32 mmol/L Final    AST (SGOT) 05/10/2018 11  10 - 37 U/L Final    ALT (SGPT) 05/10/2018 7  5 - 40 U/L Final    Alk.  phosphatase 05/10/2018 77  25 - 115 U/L Final    Bilirubin, total 05/10/2018 0.2  0.2 - 1.2 mg/dL Final  Calcium 05/10/2018 9.2  8.4 - 10.5 mg/dL Final    Protein, total 05/10/2018 7.0  6.4 - 8.3 g/dL Final    Albumin 05/10/2018 4.2  3.5 - 5.0 g/dL Final    A-G Ratio 05/10/2018 1.5  1.1 - 2.6 ratio Final    Globulin 05/10/2018 2.8  2.0 - 4.0 g/dL Final    Anion gap 05/10/2018 14.0  mmol/L Final    Comment: Test includes Albumin, Alkaline Phosphatase, ALT, AST, BUN, Calcium, CO2,  Chloride, Creatinine, Glucose, Potassium, Sodium, Total Bilirubin and Total  Protein. Estimated GFR results are reported in mL/min/1.73 sq.m. by the MDRD equation. This eGFR is validated for stable chronic renal failure patients. This   equation  is unreliable in acute illness or patients with normal renal function.  GFRAA 05/10/2018 >60.0  >60.0 Final    GFRNA 05/10/2018 >60.0  >60.0 Final    T4, Free 05/10/2018 1.0  0.9 - 1.8 ng/dL Final    TSH 05/10/2018 1.41  0.27 - 4.20 mcU/mL Final    VITAMIN D, 25-HYDROXY 05/10/2018 18.0* 32.0 - 100.0 ng/mL Final    Specific Gravity 05/10/2018 1.016  1.005 - 1.03 Final    pH (UA) 05/10/2018 5.0  5.0 - 8.0 pH Final    Protein 05/10/2018 Negative  Negative, mg/dL Final    Glucose 05/10/2018 Negative  Negative mg/dL Final    Ketone 05/10/2018 Negative  Negative mg/dL Final    Bilirubin 05/10/2018 Negative  Negative Final    Blood 05/10/2018 SMALL* Negative Final    Nitrites 05/10/2018 Positive* Negative Final    Leukocyte Esterase 05/10/2018 Negative  Negative Final    Urobilinogen 05/10/2018 <2.0  <2.0 mg/dL Final    WBC 05/10/2018 0-2  0 - 2 /hpf Final    RBC 05/10/2018 0-2  Negative, /hpf Final    Bacteria 05/10/2018 Present* Negative Final    Epithelial cells 05/10/2018 5-10* 0 - 2 /hpf Final       Assessment/Plan:   hypertension unstable. current treatment plan is ineffective, change in therapy made today. lab results and schedule of future lab studies reviewed with patient. hypertension poorly controlled.   orders and follow up as documented in patient record  reviewed diet, exercise and weight control  recommended sodium restriction  copy of written low fat low cholesterol diet provided and reviewed  cardiovascular risk and specific lipid/LDL goals reviewed  reviewed medications and side effects in detail  the following changes are made -  lisinopril/ hctz increased to 20/12.5 mg twice daily, Norvasc 10 mg daily. ICD-10-CM ICD-9-CM    1. Essential hypertension I10 401.9 lisinopril-hydroCHLOROthiazide (PRINZIDE, ZESTORETIC) 20-12.5 mg per tablet      amLODIPine (NORVASC) 10 mg tablet     Follow up in one month for hypertension evaluation- bring blood pressure log.  - Patient was given four AA batteries, and a blood pressure log upon leaving clinic today. Recommendations:  Limit sodium < 1500 mg/day  DASH diet  Wt reduction and maintenance  Exercise- 30 min 5 days/wk with intense 20 min 3 days/wk  Moderation of Alcohol intake: 1 serving/day, 5/wk -female, 2 servings/day  9/wk-male  Do not smoke  Avoid NSAIDs, pseudoephedrine, phenylephrine and herbal suppliments such as bitter orange, ginsing, duarte's wort, licorice, caffeine pills. Discussed with patient at length the need for blood pressure re-evaluation and management with primary care. Discussed the long term sequelae for elevated blood pressure including blindness, CVA, MI, and renal failure/ dialysis. Pt agrees to follow up as directed.      Chi Garcia  [facility]  [address], [address]  Phone [phone]  Fax [phone]

## 2018-08-30 ENCOUNTER — OFFICE VISIT (OUTPATIENT)
Dept: OBGYN CLINIC | Age: 43
End: 2018-08-30

## 2018-08-30 VITALS
DIASTOLIC BLOOD PRESSURE: 75 MMHG | HEART RATE: 84 BPM | RESPIRATION RATE: 20 BRPM | HEIGHT: 65 IN | SYSTOLIC BLOOD PRESSURE: 120 MMHG | BODY MASS INDEX: 39.35 KG/M2 | OXYGEN SATURATION: 100 % | WEIGHT: 236.2 LBS

## 2018-08-30 DIAGNOSIS — Z01.419 WELL WOMAN EXAM WITH ROUTINE GYNECOLOGICAL EXAM: Primary | ICD-10-CM

## 2018-08-30 NOTE — MR AVS SNAPSHOT
[address] [id number] [phone] Patient: Hermilo Chicas MRN: [id number] ZKC:[id number] Visit Information Date & Time Provider Department Dept. Phone Encounter #  
 8/30/2018  2:30 PM [phone] [id number] Follow-up Instructions Return in about 1 year (around 8/30/2019) for Annual Exam or prn. Your Appointments 9/7/2018  9:30 AM  
Follow Up with Arthur Nesbitt  Linette Jalloh ([facility]) Appt Note: Return in about 4 weeks (around 9/7/2018) for  blood pressure check. Rene Chávez [address] [place] [address] [phone]  
[address] [facility] [address] Upcoming Health Maintenance Date Due Influenza Age 5 to Adult 8/1/2018 PAP AKA CERVICAL CYTOLOGY 5/15/2020 Allergies as of 8/30/2018  Review Complete On: 8/30/2018 By: Tara Weir LPN No Known Allergies Current Immunizations  Never Reviewed No immunizations on file. Not reviewed this visit Vitals BP Pulse Resp Height(growth percentile) Weight(growth percentile) LMP  
 120/75 84 20 5' 5\" (1.651 m) 236 lb 3.2 oz (107.1 kg) 08/10/2018 (Approximate) SpO2 BMI OB Status Smoking Status 100% 39.31 kg/m2 Unknown Never Smoker BMI and BSA Data Body Mass Index Body Surface Area  
 39.31 kg/m 2 2.22 m 2 Preferred Pharmacy Pharm[facility] Name Phone [facility] [address] [address] [facility] [phone] Your Updated Medication List  
  
   
This list is accurate as of 8/30/18  3:21 PM.  Always use your most recent med list. amLODIPine 10 mg tablet Commonly known as:  Voncile Cecil Take 1 Tab by mouth daily. lisinopril-hydroCHLOROthiazide 20-12.5 mg per tablet Commonly known as:  Moshe Posey Take 1 Tab by mouth two (2) times a day. norethindrone 0.35 mg Tab Commonly known as:  Devang [employer] Devang Take 1 Tab by mouth daily. Follow-up Instructions Return in about 1 year (around 8/30/2019) for Annual Exam or prn. Introducing [facility] SERVICES! Jagruti Castellanos introduces [employer] patient portal. Now you can access parts of your medical record, email your doctor's office, and request medication refills online. 1. In your internet browser, go to [email or web address]. [email or web address] 2. Click on the First Time User? Click Here link in the Sign In box. You will see the New Member Sign Up page. 3. Enter your [employer] Access Code exactly as it appears below. You will not need to use this code after youve completed the sign-up process. If you do not sign up before the expiration date, you must request a new code. · [employer] Access Code: [email or web address] Expires: 11/8/2018  9:56 AM 
 
4. Enter the last four digits of your Social Security Number (xxxx) and Date of Birth (mm/dd/yyyy) as indicated and click Submit. You will be taken to the next sign-up page. 5. Create a [employer] ID. This will be your [employer] login ID and cannot be changed, so think of one that is secure and easy to remember. 6. Create a [employer] password. You can change your password at any time. 7. Enter your Password Reset Question and Answer. This can be used at a later time if you forget your password. 8. Enter your e-mail address. You will receive e-mail notification when new information is available in [address] 9. Click Sign Up. You can now view and download portions of your medical record. 10. Click the Download Summary menu link to download a portable copy of your medical information. If you have questions, please visit the Frequently Asked Questions section of the [employer] website. Remember, [employer] is NOT to be used for urgent needs. For medical emergencies, dial 911. Now available from your iPhone and Android! Please provide this summary of care documentation to your next provider. Your primary care clinician is listed as NONE. If you have any questions after today's visit, please call [phone].

## 2018-08-30 NOTE — PROGRESS NOTES
Subjective:   44 y.o. female for Well Woman Check. Patient's last menstrual period was 08/10/2018 (approximate). Patient Active Problem List   Diagnosis Code    Amenorrhea N91.2    Uterine enlargement N85.2    Endometrial hyperplasia without atypia, simple N85.01    Obesity E66.9    Hypertension I10    Hyperlipidemia E78.5    Genital warts A63.0    Severe obesity (BMI 35.0-39. 9) with comorbidity ([facility] [address] E66.01    Nevus of right conjunctiva D31.01    Acute anterior uveitis of both eyes H20.9    Pre-diabetes R73.03    Vitamin D deficiency E55.9    Abnormal finding on urinalysis R82.90     Current Outpatient Prescriptions   Medication Sig Dispense Refill    lisinopril-hydroCHLOROthiazide (PRINZIDE, ZESTORETIC) 20-12.5 mg per tablet Take 1 Tab by mouth two (2) times a day. 180 Tab 0    amLODIPine (NORVASC) 10 mg tablet Take 1 Tab by mouth daily. 90 Tab 0    norethindrone (MICRONOR) 0.35 mg tab Take 1 Tab by mouth daily. 28 Tab 11     No Known Allergies  Past Medical History:   Diagnosis Date    Amenorrhea 2012    Endometrial hyperplasia without atypia, simple 2012    Genital warts 2017    Hypertension 2011    Obesity 3/5/2014    Pre-diabetes 5/10/2018    Vitamin D deficiency 5/15/2018     Past Surgical History:   Procedure Laterality Date    HX BREAST BIOPSY Left 26 yrs old    benign    HX  SECTION  6559,7257    HX GYN  1994     Family History   Problem Relation Age of Onset    Cancer Maternal Grandmother      unknonwn type    Cancer Paternal Grandmother      unknown type    Diabetes Maternal Aunt     Asthma Paternal Aunt      Social History   Substance Use Topics    Smoking status: Never Smoker    Smokeless tobacco: Never Used    Alcohol use Yes        ROS:  Feeling well. No dyspnea or chest pain on exertion. No abdominal pain, change in bowel habits, black or bloody stools. No urinary tract symptoms.  GYN ROS: normal menses, no abnormal bleeding, pelvic pain or discharge, no breast pain or new or enlarging lumps on self exam. No neurological complaints. Objective:     Visit Vitals    /75    Pulse 84    Resp 20    Ht 5' 5\" (1.651 m)    Wt 236 lb 3.2 oz (107.1 kg)    LMP 08/10/2018 (Approximate)    SpO2 100%    BMI 39.31 kg/m2     The patient appears well, alert, oriented x 3, in no distress. ENT normal.  Neck supple. No adenopathy or thyromegaly. AMAURY. Lungs are clear, good air entry, no wheezes, rhonchi or rales. S1 and S2 normal, no murmurs, regular rate and rhythm. Abdomen soft without tenderness, guarding, mass or organomegaly. Extremities show no edema, normal peripheral pulses. Neurological is normal, no focal findings. BREAST EXAM: breasts appear normal, no suspicious masses, no skin or nipple changes or axillary nodes    PELVIC EXAM: normal external genitalia, vulva, vagina, cervix, uterus and adnexa    Assessment/Plan:   well woman  return annually or prn  Previous pap from 2017 wnl  No diagnosis found. No orders of the defined types were placed in this encounter. Follow-up Disposition:  Return in about 1 year (around 8/30/2019) for Annual Exam or prn. Gardenia Funez

## 2018-08-30 NOTE — PROGRESS NOTES
Visit Vitals    /75    Pulse 84    Resp 20    Ht 5' 5\" (1.651 m)    Wt 236 lb 3.2 oz (107.1 kg)    LMP 08/10/2018 (Approximate)    SpO2 100%    BMI 39.31 kg/m2     Patient here for well woman exam. Patient states even though she takes BCP periods still irregular.

## 2018-09-07 ENCOUNTER — OFFICE VISIT (OUTPATIENT)
Dept: FAMILY MEDICINE CLINIC | Facility: CLINIC | Age: 43
End: 2018-09-07

## 2018-09-07 VITALS
HEIGHT: 65 IN | OXYGEN SATURATION: 94 % | TEMPERATURE: 96 F | SYSTOLIC BLOOD PRESSURE: 103 MMHG | DIASTOLIC BLOOD PRESSURE: 71 MMHG | WEIGHT: 236 LBS | BODY MASS INDEX: 39.32 KG/M2 | HEART RATE: 96 BPM | RESPIRATION RATE: 17 BRPM

## 2018-09-07 DIAGNOSIS — I10 ESSENTIAL HYPERTENSION: Primary | ICD-10-CM

## 2018-09-07 DIAGNOSIS — E55.9 VITAMIN D DEFICIENCY: ICD-10-CM

## 2018-09-07 PROBLEM — R73.03 PRE-DIABETES: Status: RESOLVED | Noted: 2018-05-10 | Resolved: 2018-09-07

## 2018-09-07 RX ORDER — ASPIRIN 325 MG
50000 TABLET, DELAYED RELEASE (ENTERIC COATED) ORAL
Qty: 12 CAP | Refills: 3 | Status: SHIPPED | OUTPATIENT
Start: 2018-09-07 | End: 2018-11-24

## 2018-09-07 NOTE — PATIENT INSTRUCTIONS

## 2018-09-07 NOTE — PROGRESS NOTES
Subjective:     Giuseppe Rand is a 44 y.o. female who presents for follow up of hypertension, obesity and  Vitamin D deficiency. HTN  Home BP Monitoring: is well controlled at home, ranging 120's 's/ 80's, Reviewed My Chart blood pressure diary with patient at todays visit. Her blood pressure is normal today. Obesity  Diet and Lifestyle: not attempting to follow a low fat, low cholesterol diet, generally follows a low sodium diet, exercises sporadically     Vitamin D Deficiency  Vitamin D 18, will start replacement    Cardiovascular ROS:   taking medications as instructed, no medication side effects noted, no TIA's, no chest pain on exertion, no dyspnea on exertion, no swelling of ankles. New concerns:  none    Patient Active Problem List   Diagnosis Code    Amenorrhea N91.2    Uterine enlargement N85.2    Endometrial hyperplasia without atypia, simple N85.01    Obesity E66.9    Hypertension I10    Hyperlipidemia E78.5    Genital warts A63.0    Severe obesity (BMI 35.0-39. 9) with comorbidity ([address] E66.01    Nevus of right conjunctiva D31.01    Acute anterior uveitis of both eyes H20.9    Pre-diabetes R73.03    Vitamin D deficiency E55.9    Abnormal finding on urinalysis R82.90     Patient Active Problem List    Diagnosis Date Noted    Vitamin D deficiency 05/15/2018    Abnormal finding on urinalysis 05/15/2018    Severe obesity (BMI 35.0-39. 9) with comorbidity ([address] 05/10/2018    Pre-diabetes 05/10/2018    Nevus of right conjunctiva 03/16/2018    Acute anterior uveitis of both eyes 02/15/2018    Genital warts 06/13/2017    Hypertension 04/17/2014    Hyperlipidemia 04/17/2014    Obesity 03/05/2014    Endometrial hyperplasia without atypia, simple 02/17/2012    Amenorrhea 01/23/2012    Uterine enlargement 01/23/2012     Current Outpatient Prescriptions   Medication Sig Dispense Refill    cholecalciferol (VITAMIN D3) 50,000 unit capsule Take 1 Cap by mouth every seven (7) days for 12 doses. Indications: Vitamin D Deficiency 12 Cap 3    lisinopril-hydroCHLOROthiazide (PRINZIDE, ZESTORETIC) 20-12.5 mg per tablet Take 1 Tab by mouth two (2) times a day. 180 Tab 0    amLODIPine (NORVASC) 10 mg tablet Take 1 Tab by mouth daily. 90 Tab 0    norethindrone (MICRONOR) 0.35 mg tab Take 1 Tab by mouth daily. 28 Tab 11     No Known Allergies  Past Medical History:   Diagnosis Date    Amenorrhea 2012    Endometrial hyperplasia without atypia, simple 2012    Genital warts 2017    Hypertension 2011    Obesity 3/5/2014    Pre-diabetes 5/10/2018    Vitamin D deficiency 5/15/2018     Past Surgical History:   Procedure Laterality Date    HX BREAST BIOPSY Left 26 yrs old    benign    HX  SECTION  2468,1880    HX GYN  1994     Family History   Problem Relation Age of Onset    Cancer Maternal Grandmother      unknonwn type    Cancer Paternal Grandmother      unknown type    Diabetes Maternal Aunt     Asthma Paternal Aunt      Social History   Substance Use Topics    Smoking status: Never Smoker    Smokeless tobacco: Never Used    Alcohol use Yes        Lab Results  Component Value Date/Time   WBC 6.3 05/10/2018 09:35 AM   HGB 11.8 05/10/2018 09:35 AM   HCT 38.9 05/10/2018 09:35 AM   PLATELET 576  09:35 AM   MCV 91 05/10/2018 09:35 AM     Lab Results  Component Value Date/Time   Hemoglobin A1c 5.7 05/10/2018 09:35 AM   Hemoglobin A1c 5.9 (H) 2014 12:00 AM   Glucose 85 05/10/2018 09:35 AM   LDL, calculated 134 (H) 05/10/2018 09:35 AM   Creatinine 0.8 05/10/2018 09:35 AM      Lab Results  Component Value Date/Time   Cholesterol, total 193 05/10/2018 09:35 AM   HDL Cholesterol 44 05/10/2018 09:35 AM   LDL, calculated 134 (H) 05/10/2018 09:35 AM   Triglyceride 78 05/10/2018 09:35 AM   CHOL/HDL Ratio 4.5 10/12/2017 11:15 AM     Lab Results  Component Value Date/Time   ALT (SGPT) 7 05/10/2018 09:35 AM   AST (SGOT) 11 05/10/2018 09:35 AM   Alk.  phosphatase 77 05/10/2018 09:35 AM   Bilirubin, total 0.2 05/10/2018 09:35 AM   Albumin 4.2 05/10/2018 09:35 AM   Protein, total 7.0 05/10/2018 09:35 AM   PLATELET 118 31/13/4378 09:35 AM       No results found for: INR, PTMR, PTP, PT1, PT2   Lab Results  Component Value Date/Time   GFR est non-AA >60 10/12/2017 11:15 AM   GFR est AA >60 10/12/2017 11:15 AM   Creatinine 0.8 05/10/2018 09:35 AM   BUN 12 05/10/2018 09:35 AM   Sodium 141 05/10/2018 09:35 AM   Potassium 4.4 05/10/2018 09:35 AM   Chloride 101 05/10/2018 09:35 AM   CO2 26 05/10/2018 09:35 AM     No results found for: PSA, PSA2, PSAR1, Willena Merry, PSAR3, [id number], [id number], PSALT  Lab Results  Component Value Date/Time   TSH 1.41 05/10/2018 09:35 AM   T4, Free 1.0 05/10/2018 09:35 AM      Lab Results   Component Value Date/Time    Glucose 85 05/10/2018 09:35 AM      No results found for: CPK, RCK1, RCK2, RCK3, RCK4, CKMB, CKNDX, CKND1, TROPT, TROIQ, BNPP, BNP   No results found for: BNP, BNPP, BNPPPOC, XBNPT, BNPNT   Lab Results   Component Value Date/Time    Sodium 141 05/10/2018 09:35 AM    Potassium 4.4 05/10/2018 09:35 AM    Chloride 101 05/10/2018 09:35 AM    CO2 26 05/10/2018 09:35 AM    Anion gap 14.0 05/10/2018 09:35 AM    Glucose 85 05/10/2018 09:35 AM    BUN 12 05/10/2018 09:35 AM    Creatinine 0.8 05/10/2018 09:35 AM    BUN/Creatinine ratio 12 10/12/2017 11:15 AM    GFR est AA >60 10/12/2017 11:15 AM    GFR est non-AA >60 10/12/2017 11:15 AM    Calcium 9.2 05/10/2018 09:35 AM      Lab Results   Component Value Date/Time    Sodium 141 05/10/2018 09:35 AM    Potassium 4.4 05/10/2018 09:35 AM    Chloride 101 05/10/2018 09:35 AM    CO2 26 05/10/2018 09:35 AM    Anion gap 14.0 05/10/2018 09:35 AM    Glucose 85 05/10/2018 09:35 AM    BUN 12 05/10/2018 09:35 AM    Creatinine 0.8 05/10/2018 09:35 AM    BUN/Creatinine ratio 12 10/12/2017 11:15 AM    GFR est AA >60 10/12/2017 11:15 AM    GFR est non-AA >60 10/12/2017 11:15 AM    Calcium 9.2 05/10/2018 09:35 AM    Bilirubin, total 0.2 05/10/2018 09:35 AM    ALT (SGPT) 7 05/10/2018 09:35 AM    AST (SGOT) 11 05/10/2018 09:35 AM    Alk. phosphatase 77 05/10/2018 09:35 AM    Protein, total 7.0 05/10/2018 09:35 AM    Albumin 4.2 05/10/2018 09:35 AM    Globulin 2.8 05/10/2018 09:35 AM    A-G Ratio 1.5 05/10/2018 09:35 AM      Lab Results   Component Value Date/Time    Hemoglobin A1c 5.7 05/10/2018 09:35 AM         Review of Systems, additional:  A comprehensive review of systems was negative except for that written in the HPI. Objective:     Visit Vitals    LMP 08/10/2018 (Approximate)     Appearance: alert, well appearing, and in no distress, oriented to person, place, and time, overweight and well hydrated. General exam: CVS exam BP noted to be well controlled today in office, S1, S2 normal, no gallop, no murmur, chest clear, no JVD, no HSM, no edema,  peripheral vascular exam radial pulses normal, neurological exam alert, oriented, normal speech, no focal findings or movement disorder noted, motor and sensory grossly normal bilaterally, normal muscle tone, no tremors, strength 5/5. Lab reviewed  Orders Only on 05/15/2018   Component Date Value Ref Range Status    RESULT 05/15/2018 Normal   Final    Comment: Updated: [id number]  LAB [facility]#: [id number]  SOURCE: Clean Catch Urine  --FINAL REPORT--  Mixed Culture  <100,000 col/mL, more than 2 different organisms. Culture appears   contaminated  with skin edin.      Office Visit on 05/10/2018   Component Date Value Ref Range Status    WBC 05/10/2018 6.3  4.0 - 11.0 K/uL Final    RBC 05/10/2018 4.27  3.80 - 5.20 M/uL Final    HGB 05/10/2018 11.8  11.7 - 15.5 g/dL Final    HCT 05/10/2018 38.9  35.1 - 46.5 % Final    MCV 05/10/2018 91  80 - 95 fL Final    MCH 05/10/2018 28  26 - 34 pg Final    MCHC 05/10/2018 30* 31 - 36 g/dL Final    RDW 05/10/2018 16.4* 10.0 - 15.5 % Final    PLATELET 32/28/8420 926  140 - 440 K/uL Final    MPV 05/10/2018 11.3  9.0 - 13.0 fL Final    NEUTROPHILS 05/10/2018 52  40 - 75 % Final    Lymphocytes 05/10/2018 39  20 - 45 % Final    MONOCYTES 05/10/2018 5  3 - 12 % Final    EOSINOPHILS 05/10/2018 3  0 - 6 % Final    BASOPHILS 05/10/2018 1  0 - 2 % Final    ABS. NEUTROPHILS 05/10/2018 3.3  1.8 - 7.7 K/uL Final    ABSOLUTE LYMPHOCYTE COUNT 05/10/2018 2.4  1.0 - 4.8 K/uL Final    ABS. MONOCYTES 05/10/2018 0.3  0.1 - 1.0 K/uL Final    ABS. EOSINOPHILS 05/10/2018 0.2  0.0 - 0.5 K/uL Final    ABS. BASOPHILS 05/10/2018 0.0  0.0 - 0.2 K/uL Final    Hemoglobin A1c 05/10/2018 5.7  4.8 - 5.9 % Final    AVG GLU 05/10/2018 116  91 - 123 mg/dL Final    Triglyceride 05/10/2018 78  40 - 149 mg/dL Final    HDL Cholesterol 05/10/2018 44  40 - 59 mg/dL Final    Cholesterol, total 05/10/2018 193  110 - 200 mg/dL Final    CHOLESTEROL/HDL 05/10/2018 4.4  0.0 - 5.0 Final    LDL, calculated 05/10/2018 134* 50 - 99 mg/dL Final    VLDL, calculated 05/10/2018 16  8 - 30 mg/dL Final    Comment: Test includes cholesterol, HDL cholesterol, triglycerides and LDL. Cholesterol Recommended NCEP guidelines in mg/dL:  Less than 200            Desirable  200 - 239                Borderline High  Greater than or  = 240   High  Please Note:  Total Chol/HDL Ratio                   Men     Women  1/2 Avg. Risk    3.4     3.3      Avg. Risk    5.0     4.4  2X  Avg. Risk    9.6     7.1  3X  Avg. Risk   23.4    11.0      Glucose 05/10/2018 85  70 - 99 mg/dL Final    BUN 05/10/2018 12  6 - 22 mg/dL Final    Creatinine 05/10/2018 0.8  0.5 - 1.2 mg/dL Final    Sodium 05/10/2018 141  133 - 145 mmol/L Final    Potassium 05/10/2018 4.4  3.5 - 5.5 mmol/L Final    Chloride 05/10/2018 101  98 - 110 mmol/L Final    CO2 05/10/2018 26  20 - 32 mmol/L Final    AST (SGOT) 05/10/2018 11  10 - 37 U/L Final    ALT (SGPT) 05/10/2018 7  5 - 40 U/L Final    Alk.  phosphatase 05/10/2018 77  25 - 115 U/L Final    Bilirubin, total 05/10/2018 0.2  0.2 - 1.2 mg/dL Final    Calcium 05/10/2018 9.2  8.4 - 10.5 mg/dL Final    Protein, total 05/10/2018 7.0  6.4 - 8.3 g/dL Final    Albumin 05/10/2018 4.2  3.5 - 5.0 g/dL Final    A-G Ratio 05/10/2018 1.5  1.1 - 2.6 ratio Final    Globulin 05/10/2018 2.8  2.0 - 4.0 g/dL Final    Anion gap 05/10/2018 14.0  mmol/L Final    Comment: Test includes Albumin, Alkaline Phosphatase, ALT, AST, BUN, Calcium, CO2,  Chloride, Creatinine, Glucose, Potassium, Sodium, Total Bilirubin and Total  Protein. Estimated GFR results are reported in mL/min/1.73 sq.m. by the MDRD equation. This eGFR is validated for stable chronic renal failure patients. This   equation  is unreliable in acute illness or patients with normal renal function.  GFRAA 05/10/2018 >60.0  >60.0 Final    GFRNA 05/10/2018 >60.0  >60.0 Final    T4, Free 05/10/2018 1.0  0.9 - 1.8 ng/dL Final    TSH 05/10/2018 1.41  0.27 - 4.20 mcU/mL Final    VITAMIN D, 25-HYDROXY 05/10/2018 18.0* 32.0 - 100.0 ng/mL Final    Specific Gravity 05/10/2018 1.016  1.005 - 1.03 Final    pH (UA) 05/10/2018 5.0  5.0 - 8.0 pH Final    Protein 05/10/2018 Negative  Negative, mg/dL Final    Glucose 05/10/2018 Negative  Negative mg/dL Final    Ketone 05/10/2018 Negative  Negative mg/dL Final    Bilirubin 05/10/2018 Negative  Negative Final    Blood 05/10/2018 SMALL* Negative Final    Nitrites 05/10/2018 Positive* Negative Final    Leukocyte Esterase 05/10/2018 Negative  Negative Final    Urobilinogen 05/10/2018 <2.0  <2.0 mg/dL Final    WBC 05/10/2018 0-2  0 - 2 /hpf Final    RBC 05/10/2018 0-2  Negative, /hpf Final    Bacteria 05/10/2018 Present* Negative Final    Epithelial cells 05/10/2018 5-10* 0 - 2 /hpf Final   ]    Assessment/Plan:     hypertension well controlled. current treatment plan is effective, no change in therapy. ICD-10-CM ICD-9-CM    1. Essential hypertension I10 401.9    2.  Class 2 obesity with serious comorbidity and body mass index (BMI) of 39.0 to 39.9 in adult, unspecified obesity type E66.9 278.00     Z68.39 V85.39    3. Vitamin D deficiency E55.9 268.9 cholecalciferol (VITAMIN D3) 50,000 unit capsule     Follow up around 11/13/18 for routine exam and labs  Return prn or acute care    After visit summary summary given. Patient encouraged to sign up for mychart.     [facility]  [facility]  [address] [place], [address]  Phone [phone]  Fax [phone]

## 2018-09-07 NOTE — PROGRESS NOTES
Katie Prieto is a 44 y.o.  female presents today for office visit for follow up. Pt would also like to discuss HTN. Pt is not fasting. Pt is in Room # 9      1. Have you been to the ER, urgent care clinic since your last visit? Hospitalized since your last visit? No    2. Have you seen or consulted any other health care providers outside of the [address] [place] [address] since your last visit? Include any pap smears or colon screening. No    Upcoming Appts  none    Health Maintenance reviewed      VORB: No orders of the defined types were placed in this encounter.   Erick Yo LPN

## 2018-09-07 NOTE — MR AVS SNAPSHOT
Anselmo Tennille 
 
 
 [address] [facility] [address] [address] 
[phone] Patient: Tan Miguel MRN: [id number] AQE:0[birth year] Visit Information Date & Time Provider Department Dept. Phone Encounter #  
 9/7/2018  9:30 AM Maria A Cuellar NP [facility] [phone] [id number] Follow-up Instructions Return in about 2 months (around 11/13/2018). Upcoming Health Maintenance Date Due DTaP/Tdap/Td series (1 - Tdap) 6/7/1997 Influenza Age 5 to Adult 8/1/2018 PAP AKA CERVICAL CYTOLOGY 5/15/2020 Allergies as of 9/7/2018  Review Complete On: 9/7/2018 By: Maria A Cuellar NP No Known Allergies Current Immunizations  Never Reviewed No immunizations on file. Not reviewed this visit You Were Diagnosed With   
  
 Codes Comments Essential hypertension    -  Primary ICD-10-CM: I10 
ICD-9-CM: 401.9 Class 2 obesity with serious comorbidity and body mass index (BMI) of 39.0 to 39.9 in adult, unspecified obesity type     ICD-10-CM: E66.9, Z68.39 ICD-9-CM: 278.00, V85.39 Vitamin D deficiency     ICD-10-CM: E55.9 ICD-9-CM: 268.9 Vitals BP Pulse Temp Resp Height(growth percentile) Weight(growth percentile) 103/71 (BP 1 Location: Right arm, BP Patient Position: Sitting) 96 96 °F (35.6 °C) (Oral) 17 5' 5\" (1.651 m) 236 lb (107 kg) LMP SpO2 BMI OB Status Smoking Status 08/10/2018 (Approximate) 94% 39.27 kg/m2 Unknown Never Smoker BMI and BSA Data Body Mass Index Body Surface Area  
 39.27 kg/m 2 2.22 m 2 Preferred Pharmacy Pharmacy Name Phone [facility] - [address], [address] [phone] Your Updated Medication List  
  
   
This list is accurate as of 9/7/18  9:44 AM.  Always use your most recent med list. amLODIPine 10 mg tablet Commonly known as:  Viviana Croon Take 1 Tab by mouth daily. cholecalciferol 50,000 unit capsule Commonly known as:  VITAMIN D3 Take 1 Cap by mouth every seven (7) days for 12 doses. Indications: Vitamin D Deficiency  
  
 lisinopril-hydroCHLOROthiazide 20-12.5 mg per tablet Commonly known as:  Joselin Mcardle Take 1 Tab by mouth two (2) times a day. norethindrone 0.35 mg Tab Commonly known as:  Devang & Devang Take 1 Tab by mouth daily. Prescriptions Sent to Pharmacy Refills  
 cholecalciferol (VITAMIN D3) 50,000 unit capsule 3 Sig: Take 1 Cap by mouth every seven (7) days for 12 doses. Indications: Vitamin D Deficiency Class: Normal  
 Pharmacy: [id number]  #: [phone] Route: Oral  
  
Follow-up Instructions Return in about 2 months (around 11/13/2018). Patient Instructions High Blood Pressure: Care Instructions Your Care Instructions If your blood pressure is usually above 130/80, you have high blood pressure, or hypertension. That means the top number is 130 or higher or the bottom number is 80 or higher, or both. Despite what a lot of people think, high blood pressure usually doesn't cause headaches or make you feel dizzy or lightheaded. It usually has no symptoms. But it does increase your risk for heart attack, stroke, and kidney or eye damage. The higher your blood pressure, the more your risk increases. Your doctor will give you a goal for your blood pressure. Your goal will be based on your health and your age. Lifestyle changes, such as eating healthy and being active, are always important to help lower blood pressure. You might also take medicine to reach your blood pressure goal. 
Follow-up care is a key part of your treatment and safety. Be sure to make and go to all appointments, and call your doctor if you are having problems. It's also a good idea to know your test results and keep a list of the medicines you take. How can you care for yourself at home? Medical treatment · If you stop taking your medicine, your blood pressure will go back up. You may take one or more types of medicine to lower your blood pressure. Be safe with medicines. Take your medicine exactly as prescribed. Call your doctor if you think you are having a problem with your medicine. · Talk to your doctor before you start taking aspirin every day. Aspirin can help certain people lower their risk of a heart attack or stroke. But taking aspirin isn't right for everyone, because it can cause serious bleeding. · See your doctor regularly. You may need to see the doctor more often at first or until your blood pressure comes down. · If you are taking blood pressure medicine, talk to your doctor before you take decongestants or anti-inflammatory medicine, such as ibuprofen. Some of these medicines can raise blood pressure. · Learn how to check your blood pressure at home. Lifestyle changes · Stay at a healthy weight. This is especially important if you put on weight around the waist. Losing even 10 pounds can help you lower your blood pressure. · If your doctor recommends it, get more exercise. Walking is a good choice. Bit by bit, increase the amount you walk every day. Try for at least 30 minutes on most days of the week. You also may want to swim, bike, or do other activities. · Avoid or limit alcohol. Talk to your doctor about whether you can drink any alcohol. · Try to limit how much sodium you eat to less than 2,300 milligrams (mg) a day. Your doctor may ask you to try to eat less than 1,500 mg a day. · Eat plenty of fruits (such as bananas and oranges), vegetables, legumes, whole grains, and low-fat dairy products. · Lower the amount of saturated fat in your diet. Saturated fat is found in animal products such as milk, cheese, and meat. Limiting these foods may help you lose weight and also lower your risk for heart disease. · Do not smoke. Smoking increases your risk for heart attack and stroke. If you need help quitting, talk to your doctor about stop-smoking programs and medicines. These can increase your chances of quitting for good. When should you call for help? Call 911 anytime you think you may need emergency care. This may mean having symptoms that suggest that your blood pressure is causing a serious heart or blood vessel problem. Your blood pressure may be over 180/110. 
 For example, call 911 if: 
  · You have symptoms of a heart attack. These may include: ¨ Chest pain or pressure, or a strange feeling in the chest. 
¨ Sweating. ¨ Shortness of breath. ¨ Nausea or vomiting. ¨ Pain, pressure, or a strange feeling in the back, neck, jaw, or upper belly or in one or both shoulders or arms. ¨ Lightheadedness or sudden weakness. ¨ A fast or irregular heartbeat.  
  · You have symptoms of a stroke. These may include: 
¨ Sudden numbness, tingling, weakness, or loss of movement in your face, arm, or leg, especially on only one side of your body. ¨ Sudden vision changes. ¨ Sudden trouble speaking. ¨ Sudden confusion or trouble understanding simple statements. ¨ Sudden problems with walking or balance. ¨ A sudden, severe headache that is different from past headaches.  
  · You have severe back or belly pain.  
 Do not wait until your blood pressure comes down on its own. Get help right away. 
 Call your doctor now or seek immediate care if: 
  · Your blood pressure is much higher than normal (such as 180/110 or higher), but you don't have symptoms.  
  · You think high blood pressure is causing symptoms, such as: ¨ Severe headache. ¨ Blurry vision.  
 Watch closely for changes in your health, and be sure to contact your doctor if: 
  · Your blood pressure measures 140/90 or higher at least 2 times. That means the top number is 140 or higher or the bottom number is 90 or higher, or both.   · You think you may be having side effects from your blood pressure medicine.  
  · Your blood pressure is usually normal, but it goes above normal at least 2 times. Where can you learn more? Go to http://kerry-chucho.info/. Enter R504 in the search box to learn more about \"High Blood Pressure: Care Instructions. \" Current as of: December 6, 2017 Content Version: 11.7 © 9705-8731 [employer]. Care instructions adapted under license by [employer] (which disclaims liability or warranty for this information). If you have questions about a medical condition or this instruction, always ask your healthcare professional. [address] any warranty or liability for your use of this information. Introducing hospitals & HEALTH SERVICES! [place] [facility] introduces [employer] patient portal. Now you can access parts of your medical record, email your doctor's office, and request medication refills online. 1. In your internet browser, go to [email or web address]. [email or web address] 2. Click on the First Time User? Click Here link in the Sign In box. You will see the New Member Sign Up page. 3. Enter your [employer] Access Code exactly as it appears below. You will not need to use this code after youve completed the sign-up process. If you do not sign up before the expiration date, you must request a new code. · [employer] Access Code: [email or web address] Expires: 11/8/2018  9:56 AM 
 
4. Enter the last four digits of your Social Security Number (xxxx) and Date of Birth (mm/dd/yyyy) as indicated and click Submit. You will be taken to the next sign-up page. 5. Create a [employer] ID. This will be your [employer] login ID and cannot be changed, so think of one that is secure and easy to remember. 6. Create a [employer] password. You can change your password at any time. 7. Enter your Password Reset Question and Answer.  This can be used at a later time if you forget your password. 8. Enter your e-mail address. You will receive e-mail notification when new information is available in [address] 9. Click Sign Up. You can now view and download portions of your medical record. 10. Click the Download Summary menu link to download a portable copy of your medical information. If you have questions, please visit the Frequently Asked Questions section of the [employer] website. Remember, [employer] is NOT to be used for urgent needs. For medical emergencies, dial 911. Now available from your iPhone and Android! Please provide this summary of care documentation to your next provider. Your primary care clinician is listed as NONE. If you have any questions after today's visit, please call [phone].

## 2018-12-03 DIAGNOSIS — I10 ESSENTIAL HYPERTENSION: ICD-10-CM

## 2018-12-04 RX ORDER — AMLODIPINE BESYLATE 10 MG/1
TABLET ORAL
Qty: 30 TAB | Refills: 0 | Status: SHIPPED | OUTPATIENT
Start: 2018-12-04 | End: 2019-12-09

## 2018-12-28 ENCOUNTER — OFFICE VISIT (OUTPATIENT)
Dept: FAMILY MEDICINE CLINIC | Facility: CLINIC | Age: 43
End: 2018-12-28

## 2018-12-28 NOTE — PATIENT INSTRUCTIONS
DASH Diet: Care Instructions Your Care Instructions The DASH diet is an eating plan that can help lower your blood pressure. DASH stands for Dietary Approaches to Stop Hypertension. Hypertension is high blood pressure. The DASH diet focuses on eating foods that are high in calcium, potassium, and magnesium. These nutrients can lower blood pressure. The foods that are highest in these nutrients are fruits, vegetables, low-fat dairy products, nuts, seeds, and legumes. But taking calcium, potassium, and magnesium supplements instead of eating foods that are high in those nutrients does not have the same effect. The DASH diet also includes whole grains, fish, and poultry. The DASH diet is one of several lifestyle changes your doctor may recommend to lower your high blood pressure. Your doctor may also want you to decrease the amount of sodium in your diet. Lowering sodium while following the DASH diet can lower blood pressure even further than just the DASH diet alone. Follow-up care is a key part of your treatment and safety. Be sure to make and go to all appointments, and call your doctor if you are having problems. It's also a good idea to know your test results and keep a list of the medicines you take. How can you care for yourself at home? Following the DASH diet · Eat 4 to 5 servings of fruit each day. A serving is 1 medium-sized piece of fruit, ½ cup chopped or canned fruit, 1/4 cup dried fruit, or 4 ounces (½ cup) of fruit juice. Choose fruit more often than fruit juice. · Eat 4 to 5 servings of vegetables each day. A serving is 1 cup of lettuce or raw leafy vegetables, ½ cup of chopped or cooked vegetables, or 4 ounces (½ cup) of vegetable juice. Choose vegetables more often than vegetable juice. · Get 2 to 3 servings of low-fat and fat-free dairy each day. A serving is 8 ounces of milk, 1 cup of yogurt, or 1 ½ ounces of cheese. · Eat 6 to 8 servings of grains each day. A serving is 1 slice of bread, 1 ounce of dry cereal, or ½ cup of cooked rice, pasta, or cooked cereal. Try to choose whole-grain products as much as possible. · Limit lean meat, poultry, and fish to 2 servings each day. A serving is 3 ounces, about the size of a deck of cards. · Eat 4 to 5 servings of nuts, seeds, and legumes (cooked dried beans, lentils, and split peas) each week. A serving is 1/3 cup of nuts, 2 tablespoons of seeds, or ½ cup of cooked beans or peas. · Limit fats and oils to 2 to 3 servings each day. A serving is 1 teaspoon of vegetable oil or 2 tablespoons of salad dressing. · Limit sweets and added sugars to 5 servings or less a week. A serving is 1 tablespoon jelly or jam, ½ cup sorbet, or 1 cup of lemonade. · Eat less than 2,300 milligrams (mg) of sodium a day. If you limit your sodium to 1,500 mg a day, you can lower your blood pressure even more. Tips for success · Start small. Do not try to make dramatic changes to your diet all at once. You might feel that you are missing out on your favorite foods and then be more likely to not follow the plan. Make small changes, and stick with them. Once those changes become habit, add a few more changes. · Try some of the following: ? Make it a goal to eat a fruit or vegetable at every meal and at snacks. This will make it easy to get the recommended amount of fruits and vegetables each day. ? Try yogurt topped with fruit and nuts for a snack or healthy dessert. ? Add lettuce, tomato, cucumber, and onion to sandwiches. ? Combine a ready-made pizza crust with low-fat mozzarella cheese and lots of vegetable toppings. Try using tomatoes, squash, spinach, broccoli, carrots, cauliflower, and onions. ? Have a variety of cut-up vegetables with a low-fat dip as an appetizer instead of chips and dip. ? Sprinkle sunflower seeds or chopped almonds over salads.  Or try adding chopped walnuts or almonds to cooked vegetables. ? Try some vegetarian meals using beans and peas. Add garbanzo or kidney beans to salads. Make burritos and tacos with mashed orozco beans or black beans. Where can you learn more? Go to http://kerry-chucho.info/. Enter Q990 in the search box to learn more about \"DASH Diet: Care Instructions. \" Current as of: December 6, 2017 Content Version: 11.8 © 3522-7624 [employer]. Care instructions adapted under license by [employer] (which disclaims liability or warranty for this information). If you have questions about a medical condition or this instruction, always ask your healthcare professional. [address] any warranty or liability for your use of this information. Learning About Obesity What is obesity? Obesity means having so much body fat that your health is in danger. Having too much body fat can lead to type 2 diabetes, heart disease, high blood pressure, arthritis, sleep apnea, and stroke. Even if you don't feel bad now, think about these health risks. Do they seem like a good reason to start on a new path toward a healthier weight? Or do you have another personal, powerful reason for wanting to lose weight? Whatever it is, keep it in mind. It can be hard to change eating habits and exercise habits. But with your own reason and plan, you can do it. How do you know if your weight is in the obesity range? To know if your weight is in the obesity range, your doctor looks at your body mass index (BMI) and waist size. Your BMI is a number that is calculated from your weight and your height. To figure your BMI for yourself, get a BMI table from your doctor or use an online tool, such as http://www.luke.com/ on the [employer] of [employer]. A healthy BMI is from 18.5 to 24.9.  If your BMI is from 30.0 to 39.9, you are considered to have obesity. If your BMI is over 40.0, you are considered to have extreme obesity. What causes obesity? When you take in more calories than you burn off, you gain weight. How you eat, how active you are, and other things affect how your body uses calories and whether you gain weight. If you have family members who have too much body fat, you may have inherited a tendency to gain weight. And your family also helps form your eating and lifestyle habits, which can lead to obesity. Also, our busy lives make it harder to plan and cook healthy meals. For many of us, it's easier to reach for prepared foods, go out to eat, or go to the drive-through. But these foods are often high in saturated fat and calories. Portions are often too large. What can you do to reach a healthy weight? Focus on health, not diets. Diets are hard to stay on and don't work in the long run. It is very hard to stay with a diet that includes lots of big changes in your eating habits. Instead of a diet, focus on lifestyle changes that will improve your health and achieve the right balance of energy and calories. To lose weight, you need to burn more calories than you take in. You can do it by eating healthy foods in reasonable amounts and becoming more active, even a little bit every day. Making small changes over time can add up to a lot. Make a plan for change. Many people have found that naming their reasons for change and staying focused on their plan can make a big difference. Work with your doctor to create a plan that is right for you. · Ask yourself: Rashad Thayer are my personal, most powerful reasons for wanting this change? What will my life look like when I've made the change? \" · Set your long-term goal. Make it specific, such as \"I will lose x pounds. \" 
· Break your long-term goal into smaller, short-term goals. Make these small steps specific and within your reach, things you know you can do. These steps are what keep you going from day to day. Talk with your doctor about other weight-loss options. If you have a BMI in a certain range and have not been able to lose weight with diet and exercise, medicine or surgery may be an option for you. Before your doctor will prescribe medicines or surgery, he or she will probably want you to be more active and follow your healthy eating plan for a period of time. These habits are key lifelong changes for managing your weight, with or without other medical treatment. And these changes can help you avoid weight-related health problems. How can you stay on your plan for change? Be ready. Choose to start during a time when there are few events that might trigger slip-ups, like holidays, social events, and high-stress periods. Decide on your first few steps. Most people have more success when they make small changes, one step at a time. For example, you might switch a daily candy bar to a piece of fruit, walk 10 minutes more, or add more vegetables to a meal. 
Line up your support people. Make sure you're not going to be alone as you make this change. Connect with people who understand how important it is to you. Ask family members and friends for help in keeping with your plan. And think about who could make it harder for you, and how to handle them. Try tracking. People who keep track of what they eat, feel, and do are better at losing weight. Try writing down things like: · What and how much you eat. · How you feel before and after each meal. 
· Details about each meal (like eating out or at home, eating alone, or with friends or family). · What you do to be active. Look and plan. As you track, look for patterns that you may want to change. Take note of: · When you eat and whether you skip meals. · How often you eat out. · How many fruits and vegetables you eat. · When you eat beyond feeling full. · When and why you eat for reasons other than being hungry. When you stray from your plan, don't get upset. Figure out what made you slip up and how you can fix it. Can you take medicines or have surgery to lose weight? If you have a BMI in a certain range and have not been able to lose weight with diet and exercise, medicine or surgery may be an option for you. If you have a BMI of at least 30.0 (or a BMI of at least 27.0 and another health problem related to your weight), ask your doctor about weight-loss medicines. They work by making you feel less hungry, making you feel full more quickly, or changing how you digest fat. Medicines are used along with diet changes and more physical activity to help you make lasting changes. If you have a BMI of 40.0 or more (or a BMI of 35.0 or more and another health problem related to your weight), your doctor may talk with you about surgery. Weight-loss surgery has risks, and you will need to work with your doctor to compare the risk of having obesity with the risks of surgery. With any option you choose, you will still need to eat a healthy diet and get regular exercise. Follow-up care is a key part of your treatment and safety. Be sure to make and go to all appointments, and call your doctor if you are having problems. It's also a good idea to know your test results and keep a list of the medicines you take. Where can you learn more? Go to http://kerry-chucho.info/. Enter N111 in the search box to learn more about \"Learning About Obesity. \" Current as of: June 26, 2018 Content Version: 11.8 © 1615-2016 [employer]. Care instructions adapted under license by [employer] (which disclaims liability or warranty for this information).  If you have questions about a medical condition or this instruction, always ask your healthcare professional. [address] any warranty or liability for your use of this information. Influenza (Flu) Vaccine (Inactivated or Recombinant): What You Need to Know Why get vaccinated? Influenza (\"flu\") is a contagious disease that spreads around the [place] every winter, usually between October and May. Flu is caused by influenza viruses and is spread mainly by coughing, sneezing, and close contact. Anyone can get flu. Flu strikes suddenly and can last several days. Symptoms vary by age, but can include: · Fever/chills. · Sore throat. · Muscle aches. · Fatigue. · Cough. · Headache. · Runny or stuffy nose. Flu can also lead to pneumonia and blood infections, and cause diarrhea and seizures in children. If you have a medical condition, such as heart or lung disease, flu can make it worse. Flu is more dangerous for some people. Infants and young children, people 72years of age and older, pregnant women, and people with certain health conditions or a weakened immune system are at greatest risk. Each year thousands of people in the [facility] die from flu, and many more are hospitalized. Flu vaccine can: · Keep you from getting flu. · Make flu less severe if you do get it. · Keep you from spreading flu to your family and other people. Inactivated and recombinant flu vaccines A dose of flu vaccine is recommended every flu season. Children 6 months through 6years of age may need two doses during the same flu season. Everyone else needs only one dose each flu season. Some inactivated flu vaccines contain a very small amount of a mercury-based preservative called thimerosal. Studies have not shown thimerosal in vaccines to be harmful, but flu vaccines that do not contain thimerosal are available. There is no live flu virus in flu shots. They cannot cause the flu. There are many flu viruses, and they are always changing.  Each year a new flu vaccine is made to protect against three or four viruses that are likely to cause disease in the upcoming flu season. But even when the vaccine doesn't exactly match these viruses, it may still provide some protection. Flu vaccine cannot prevent: · Flu that is caused by a virus not covered by the vaccine. · Illnesses that look like flu but are not. Some people should not get this vaccine Tell the person who is giving you the vaccine: · If you have any severe (life-threatening) allergies. If you ever had a life-threatening allergic reaction after a dose of flu vaccine, or have a severe allergy to any part of this vaccine, you may be advised not to get vaccinated. Most, but not all, types of flu vaccine contain a small amount of egg protein. · If you ever had Guillain-Barré syndrome (also called GBS) Some people with a history of GBS should not get this vaccine. This should be discussed with your doctor. · If you are not feeling well. It is usually okay to get flu vaccine when you have a mild illness, but you might be asked to come back when you feel better. Risks of a vaccine reaction With any medicine, including vaccines, there is a chance of reactions. These are usually mild and go away on their own, but serious reactions are also possible. Most people who get a flu shot do not have any problems with it. Minor problems following a flu shot include: · Soreness, redness, or swelling where the shot was given · Hoarseness · Sore, red or itchy eyes · Cough · Fever · Aches · Headache · Itching · Fatigue If these problems occur, they usually begin soon after the shot and last 1 or 2 days. More serious problems following a flu shot can include the following: · There may be a small increased risk of Guillain-Barré Syndrome (GBS) after inactivated flu vaccine. This risk has been estimated at 1 or 2 additional cases per million people vaccinated. This is much lower than the risk of severe complications from flu, which can be prevented by flu vaccine. · Marybeth Veeker children who get the flu shot along with pneumococcal vaccine (PCV13) and/or DTaP vaccine at the same time might be slightly more likely to have a seizure caused by fever. Ask your doctor for more information. Tell your doctor if a child who is getting flu vaccine has ever had a seizure Problems that could happen after any injected vaccine: · People sometimes faint after a medical procedure, including vaccination. Sitting or lying down for about 15 minutes can help prevent fainting, and injuries caused by a fall. Tell your doctor if you feel dizzy, or have vision changes or ringing in the ears. · Some people get severe pain in the shoulder and have difficulty moving the arm where a shot was given. This happens very rarely. · Any medication can cause a severe allergic reaction. Such reactions from a vaccine are very rare, estimated at about 1 in a million doses, and would happen within a few minutes to a few hours after the vaccination. As with any medicine, there is a very remote chance of a vaccine causing a serious injury or death. The safety of vaccines is always being monitored. For more information, visit: www.cdc.gov/vaccinesafety/. What if there is a serious reaction? What should I look for? · Look for anything that concerns you, such as signs of a severe allergic reaction, very high fever, or unusual behavior. Signs of a severe allergic reaction can include hives, swelling of the face and throat, difficulty breathing, a fast heartbeat, dizziness, and weakness  usually within a few minutes to a few hours after the vaccination. What should I do? · If you think it is a severe allergic reaction or other emergency that can't wait, call 9-1-1 and get the person to the nearest hospital. Otherwise, call your doctor. · Reactions should be reported to the \"Vaccine Adverse Event Reporting System\" (VAERS).  Your doctor should file this report, or you can do it yourself through the VAERS website at www.vaers. [facility].gov, or by calling 3-582.431.9729. VAERS does not give medical advice. The National Vaccine Injury Compensation Program 
The National Vaccine Injury Compensation Program (VICP) is a federal program that was created to compensate people who may have been injured by certain vaccines. Persons who believe they may have been injured by a vaccine can learn about the program and about filing a claim by calling 0-754.124.3141 or visiting the [employer] website at www.[facility].gov/vaccinecompensation. There is a time limit to file a claim for compensation. How can I learn more? · Ask your healthcare provider. He or she can give you the vaccine package insert or suggest other sources of information. · Call your local or state health department. · Contact the Centers for Disease Control and Prevention (CDC): 
? Call 2-114.677.2190 (1-800-CDC-INFO) or 
? Visit CDC's website at www.cdc.gov/flu Vaccine Information Statement Inactivated Influenza Vaccine 8/7/2015) 42 LILY Alejandro Desai 557IS-61 [facility] and [employer] Centers for Disease Control and Prevention Many Vaccine Information Statements are available in Georgian and other languages. See www.immunize.org/vis. Muchas hojas de información sobre vacunas están disponibles en español y en otros idiomas. Visite www.immunize.org/vis. Care instructions adapted under license by [employer] (which disclaims liability or warranty for this information). If you have questions about a medical condition or this instruction, always ask your healthcare professional. [place] [address] any warranty or liability for your use of this information. Tdap (Tetanus, Diphtheria, Pertussis) Vaccine: What You Need to Know Why get vaccinated? Tetanus, diphtheria, and pertussis are very serious diseases. Tdap vaccine can protect us from these diseases.  And Tdap vaccine given to pregnant women can protect  babies against pertussis. Tetanus (lockjaw) is rare in the [facility] today. It causes painful muscle tightening and stiffness, usually all over the body. · It can lead to tightening of muscles in the head and neck so you can't open your mouth, swallow, or sometimes even breathe. Tetanus kills about 1 out of 10 people who are infected even after receiving the best medical care. Diphtheria is also rare in the [place] today. It can cause a thick coating to form in the back of the throat. · It can lead to breathing problems, heart failure, paralysis, and death. Pertussis (whooping cough) causes severe coughing spells, which can cause difficulty breathing, vomiting, and disturbed sleep. · It can also lead to weight loss, incontinence, and rib fractures. Up to 2 in 100 adolescents and 5 in 100 adults with pertussis are hospitalized or have complications, which could include pneumonia or death. These diseases are caused by bacteria. Diphtheria and pertussis are spread from person to person through secretions from coughing or sneezing. Tetanus enters the body through cuts, scratches, or wounds. Before vaccines, as many as 200,000 cases of diphtheria, 200,000 cases of pertussis, and hundreds of cases of tetanus were reported in the [place] each year. Since vaccination began, reports of cases for tetanus and diphtheria have dropped by about 99% and for pertussis by about 80%. Tdap vaccine The Tdap vaccine can protect adolescents and adults from tetanus, diphtheria, and pertussis. One dose of Tdap is routinely given at age 6 or 15. People who did not get Tdap at that age should get it as soon as possible. Tdap is especially important for health care professionals and anyone having close contact with a baby younger than 12 months. Pregnant women should get a dose of Tdap during every pregnancy, to protect the  from pertussis.  Infants are most at risk for severe, life-threatening complications from pertussis. Another vaccine, called Td, protects against tetanus and diphtheria, but not pertussis. A Td booster should be given every 10 years. Tdap may be given as one of these boosters if you have never gotten Tdap before. Tdap may also be given after a severe cut or burn to prevent tetanus infection. Your doctor or the person giving you the vaccine can give you more information. Tdap may safely be given at the same time as other vaccines. Some people should not get this vaccine · A person who has ever had a life-threatening allergic reaction after a previous dose of any diphtheria-, tetanus-, or pertussis-containing vaccine, OR has a severe allergy to any part of this vaccine, should not get Tdap vaccine. Tell the person giving the vaccine about any severe allergies. · Anyone who had coma or long repeated seizures within 7 days after a childhood dose of DTP or DTaP, or a previous dose of Tdap, should not get Tdap, unless a cause other than the vaccine was found. They can still get Td. · Talk to your doctor if you: 
? Have seizures or another nervous system problem. ? Had severe pain or swelling after any vaccine containing diphtheria, tetanus, or pertussis. ? Ever had a condition called Guillain-Barré Syndrome (GBS). ? Aren't feeling well on the day the shot is scheduled. Risks With any medicine, including vaccines, there is a chance of side effects. These are usually mild and go away on their own. Serious reactions are also possible but are rare. Most people who get Tdap vaccine do not have any problems with it. Mild problems following Tdap 
(Did not interfere with activities) · Pain where the shot was given (about 3 in 4 adolescents or 2 in 3 adults) · Redness or swelling where the shot was given (about 1 person in 5) · Mild fever of at least 100.4°F (up to about 1 in 25 adolescents or 1 in 100 adults) · Headache (about 3 or 4 people in 10) · Tiredness (about 1 person in 3 or 4) · Nausea, vomiting, diarrhea, stomachache (up to 1 in 4 adolescents or 1 in 10 adults) · Chills, sore joints (about 1 person in 10) · Body aches (about 1 person in 3 or 4) · Rash, swollen glands (uncommon) Moderate problems following Tdap (Interfered with activities, but did not require medical attention) · Pain where the shot was given (up to 1 in 5 or 6) · Redness or swelling where the shot was given (up to about 1 in 16 adolescents or 1 in 12 adults) · Fever over 102°F (about 1 in 100 adolescents or 1 in 250 adults) · Headache (about 1 in 7 adolescents or 1 in 10 adults) · Nausea, vomiting, diarrhea, stomachache (up to 1 to 3 people in 100) · Swelling of the entire arm where the shot was given (up to about 1 in 500) Severe problems following Tdap 
(Unable to perform usual activities; required medical attention) · Swelling, severe pain, bleeding and redness in the arm where the shot was given (rare) Problems that could happen after any vaccine: · People sometimes faint after a medical procedure, including vaccination. Sitting or lying down for about 15 minutes can help prevent fainting, and injuries caused by a fall. Tell your doctor if you feel dizzy or have vision changes or ringing in the ears. · Some people get severe pain in the shoulder and have difficulty moving the arm where a shot was given. This happens very rarely. · Any medication can cause a severe allergic reaction. Such reactions from a vaccine are very rare, estimated at fewer than 1 in a million doses, and would happen within a few minutes to a few hours after the vaccination. As with any medicine, there is a very remote chance of a vaccine causing a serious injury or death. The safety of vaccines is always being monitored. For more information, visit: www.cdc.gov/vaccinesafety. What if there is a serious problem? What should I look for? · Look for anything that concerns you, such as signs of a severe allergic reaction, very high fever, or unusual behavior. Signs of a severe allergic reaction can include hives, swelling of the face and throat, difficulty breathing, a fast heartbeat, dizziness, and weakness. These would usually start a few minutes to a few hours after the vaccination. What should I do? · If you think it is a severe allergic reaction or other emergency that can't wait, call 9-1-1 or get the person to the nearest hospital. Otherwise, call your doctor. · Afterward, the reaction should be reported to the Vaccine Adverse Event Reporting System (VAERS). Your doctor might file this report, or you can do it yourself through the VAERS web site at www.vaers. [facility].gov, or by calling 2-893.378.6671. VAERS does not give medical advice. The National Vaccine Injury Compensation Program 
The National Vaccine Injury Compensation Program (VICP) is a federal program that was created to compensate people who may have been injured by certain vaccines. Persons who believe they may have been injured by a vaccine can learn about the program and about filing a claim by calling 5-823.366.7647 or visiting the [address] [place] [address] [employer] website at www.[facility].gov/vaccinecompensation. There is a time limit to file a claim for compensation. How can I learn more? · Ask your doctor. He or she can give you the vaccine package insert or suggest other sources of information. · Call your local or state health department. · Contact the Centers for Disease Control and Prevention (CDC): 
? Call 1-620.466.6836 (1-800-CDC-INFO) or 
? Visit CDC's website at www.cdc.gov/vaccines Vaccine Information Statement (Interim) Tdap Vaccine 
(2/24/15) 42 UJason Donis [facility] [id number] Department of Health and [employer] Centers for Disease Control and Prevention Many Vaccine Information Statements are available in Khmer and other languages. See www.immunize.org/vis. Muchas hojas de información sobre vacunas están disponibles en español y en otros idiomas. Visite www.immunize.org/vis. Care instructions adapted under license by [employer] (which disclaims liability or warranty for this information). If you have questions about a medical condition or this instruction, always ask your healthcare professional. [address] any warranty or liability for your use of this information.

## 2018-12-28 NOTE — PROGRESS NOTES
Benoit Matthew is a 44 y.o.  female presents today for office visit for physical. Pt would also like to discuss HTN,etc. Pt is not fasting. Pt is in Room # 9 1. Have you been to the ER, urgent care clinic since your last visit? Hospitalized since your last visit? No 
 
2. Have you seen or consulted any other health care providers outside of the [address] [place] [address] since your last visit? Include any pap smears or colon screening. No 
 
Upcoming Appts 
none Health Maintenance reviewed Flu: Tdap: VORB: No orders of the defined types were placed in this encounter.  
Cesar Thakur LPN

## 2019-12-09 ENCOUNTER — OFFICE VISIT (OUTPATIENT)
Dept: FAMILY MEDICINE CLINIC | Age: 44
End: 2019-12-09

## 2019-12-09 VITALS
HEIGHT: 65 IN | SYSTOLIC BLOOD PRESSURE: 158 MMHG | HEART RATE: 55 BPM | WEIGHT: 201 LBS | BODY MASS INDEX: 33.49 KG/M2 | TEMPERATURE: 98.4 F | RESPIRATION RATE: 16 BRPM | OXYGEN SATURATION: 99 % | DIASTOLIC BLOOD PRESSURE: 105 MMHG

## 2019-12-09 DIAGNOSIS — I10 ESSENTIAL HYPERTENSION: Primary | ICD-10-CM

## 2019-12-09 RX ORDER — LISINOPRIL 20 MG/1
20 TABLET ORAL DAILY
Qty: 30 TAB | Refills: 2 | Status: SHIPPED | OUTPATIENT
Start: 2019-12-09

## 2019-12-09 NOTE — PROGRESS NOTES
\"pt stated that she only taking lisinopril pt stated that she suppose to take it twice a day she only taking it once a day pt stated that she been without Amlodipine for about 6 months\"

## 2019-12-09 NOTE — PROGRESS NOTES
NAVIN Dennis is a 38 y.o. female being seen today for   Chief Complaint   Patient presents with    Medication Refill   follow up for this pt with hypertension not seen in a few years. she states that in the interim she had insurance and was seeing another provider. Her bp meds had been increased but she is out of everything now. She did not like the way the hctz made her run to the bathroom. She has lost 35# with walking. She feels good. Hx of amenorrhea but periods are normal now. Past Medical History:   Diagnosis Date    Amenorrhea 1/23/2012    Endometrial hyperplasia without atypia, simple 2/17/2012    Genital warts 6/13/2017    Hypertension 12/2011    Obesity 3/5/2014    Pre-diabetes 5/10/2018    Vitamin D deficiency 5/15/2018         ROS  Patient states that she is feeling well. Denies complaints of chest pain, shortness of breath, swelling of legs, dizziness or weakness. she denies nausea, vomiting or diarrhea. Current Outpatient Medications   Medication Sig    lisinopril (PRINIVIL, ZESTRIL) 20 mg tablet Take 1 Tab by mouth daily.  amLODIPine (NORVASC) 10 mg tablet TAKE ONE TABLET BY MOUTH DAILY    norethindrone (MICRONOR) 0.35 mg tab Take 1 Tab by mouth daily. No current facility-administered medications for this visit. PE  Visit Vitals  BP (!) 158/105 (BP 1 Location: Left arm, BP Patient Position: Sitting)   Pulse (!) 55   Temp 98.4 °F (36.9 °C) (Temporal)   Resp 16   Ht 5' 5\" (1.651 m)   Wt 201 lb (91.2 kg)   LMP 12/08/2019 (Exact Date)   SpO2 99%   BMI 33.45 kg/m²        Alert and oriented with normal mood and affect. she is well developed and well nourished . Lungs are clear without wheezing. Heart rate is regular without murmurs or gallops. There is no lower extremity edema.      Results for orders placed or performed in visit on 05/15/18   CULTURE, URINE   Result Value Ref Range    RESULT Normal          Assessment and Plan:        ICD-10-CM ICD-9-CM 1. Essential hypertension I10 401.9        Start with lisionpriil 20.   May need to augment but she has lost significant wt so her bp med need is likely reduced  Medicaid application  Follow up in one [facility] for bp check and labs    Robert Holman MD

## 2019-12-09 NOTE — PROGRESS NOTES
Discharge instructions reviewed with patient    Medication list and understanding of medications reviewed with patient. OTC and herbal medications reviewed and added to med list if applicable  Barriers to adherence assessed. Guidance given regarding new medications this visit, including reason for taking this medicine, and common side effects. Medicaid application given to patient    Good Rx coupon given to patient for lisinopril    AVS given to patient. Explained to patient. Patient expressed understanding.